# Patient Record
Sex: MALE | Race: WHITE | NOT HISPANIC OR LATINO | Employment: FULL TIME | URBAN - NONMETROPOLITAN AREA
[De-identification: names, ages, dates, MRNs, and addresses within clinical notes are randomized per-mention and may not be internally consistent; named-entity substitution may affect disease eponyms.]

---

## 2019-06-27 ENCOUNTER — HOSPITAL ENCOUNTER (OUTPATIENT)
Facility: HOSPITAL | Age: 49
Setting detail: OBSERVATION
Discharge: HOME | End: 2019-06-29
Attending: INTERNAL MEDICINE | Admitting: INTERNAL MEDICINE

## 2019-06-27 PROBLEM — N18.30 CHRONIC KIDNEY DISEASE, STAGE III (MODERATE) (HCC): Status: ACTIVE | Noted: 2019-06-27

## 2019-06-27 PROBLEM — R89.2 DRUG TOXICITY: Status: ACTIVE | Noted: 2019-06-27

## 2019-06-27 PROBLEM — I10 ESSENTIAL HYPERTENSION: Status: ACTIVE | Noted: 2019-06-27

## 2019-06-27 PROBLEM — R00.1 SYMPTOMATIC BRADYCARDIA: Status: ACTIVE | Noted: 2019-06-27

## 2019-06-27 PROCEDURE — 85025 COMPLETE CBC W/AUTO DIFF WBC: CPT | Performed by: NURSE PRACTITIONER

## 2019-06-27 PROCEDURE — G0378 HOSPITAL OBSERVATION PER HR: HCPCS

## 2019-06-27 PROCEDURE — 83036 HEMOGLOBIN GLYCOSYLATED A1C: CPT | Performed by: NURSE PRACTITIONER

## 2019-06-27 PROCEDURE — 80061 LIPID PANEL: CPT | Performed by: NURSE PRACTITIONER

## 2019-06-27 PROCEDURE — 80053 COMPREHEN METABOLIC PANEL: CPT | Performed by: NURSE PRACTITIONER

## 2019-06-27 PROCEDURE — 84484 ASSAY OF TROPONIN QUANT: CPT | Performed by: NURSE PRACTITIONER

## 2019-06-27 PROCEDURE — 84443 ASSAY THYROID STIM HORMONE: CPT | Performed by: NURSE PRACTITIONER

## 2019-06-27 RX ORDER — METOPROLOL SUCCINATE 100 MG/1
1.5 TABLET, EXTENDED RELEASE ORAL DAILY
COMMUNITY
End: 2019-06-29 | Stop reason: HOSPADM

## 2019-06-27 RX ORDER — SODIUM CHLORIDE 9 MG/ML
75 INJECTION, SOLUTION INTRAVENOUS CONTINUOUS
Status: DISCONTINUED | OUTPATIENT
Start: 2019-06-28 | End: 2019-06-28

## 2019-06-27 RX ORDER — HYDROCODONE BITARTRATE AND ACETAMINOPHEN 7.5; 325 MG/1; MG/1
1 TABLET ORAL EVERY 6 HOURS PRN
Status: DISCONTINUED | OUTPATIENT
Start: 2019-06-27 | End: 2019-06-29 | Stop reason: HOSPADM

## 2019-06-27 RX ORDER — CLONAZEPAM 1 MG/1
1 TABLET ORAL 2 TIMES DAILY
COMMUNITY

## 2019-06-27 RX ORDER — CARBAMAZEPINE 200 MG/1
1-2 TABLET ORAL 2 TIMES DAILY
COMMUNITY

## 2019-06-27 RX ORDER — ACETAMINOPHEN 325 MG/1
650 TABLET ORAL EVERY 4 HOURS PRN
Status: DISCONTINUED | OUTPATIENT
Start: 2019-06-27 | End: 2019-06-29 | Stop reason: HOSPADM

## 2019-06-27 RX ORDER — ALLOPURINOL 300 MG/1
300 TABLET ORAL DAILY
Status: DISCONTINUED | OUTPATIENT
Start: 2019-06-28 | End: 2019-06-29 | Stop reason: HOSPADM

## 2019-06-27 RX ORDER — GEMFIBROZIL 600 MG/1
1 TABLET, FILM COATED ORAL
COMMUNITY

## 2019-06-27 RX ORDER — LITHIUM CARBONATE 300 MG/1
300 TABLET, FILM COATED, EXTENDED RELEASE ORAL EVERY 12 HOURS SCHEDULED
Status: DISCONTINUED | OUTPATIENT
Start: 2019-06-28 | End: 2019-06-29 | Stop reason: HOSPADM

## 2019-06-27 RX ORDER — LITHIUM CARBONATE 300 MG
1 TABLET ORAL EVERY 12 HOURS SCHEDULED
COMMUNITY

## 2019-06-27 RX ORDER — CLONAZEPAM 1 MG/1
1 TABLET ORAL 2 TIMES DAILY
Status: DISCONTINUED | OUTPATIENT
Start: 2019-06-28 | End: 2019-06-29 | Stop reason: HOSPADM

## 2019-06-27 RX ORDER — ALLOPURINOL 300 MG/1
1 TABLET ORAL DAILY
COMMUNITY

## 2019-06-27 RX ORDER — ONDANSETRON 2 MG/ML
4 INJECTION INTRAMUSCULAR; INTRAVENOUS EVERY 6 HOURS PRN
Status: DISCONTINUED | OUTPATIENT
Start: 2019-06-27 | End: 2019-06-29 | Stop reason: HOSPADM

## 2019-06-27 RX ORDER — ONDANSETRON 4 MG/1
4 TABLET, FILM COATED ORAL EVERY 6 HOURS PRN
Status: DISCONTINUED | OUTPATIENT
Start: 2019-06-27 | End: 2019-06-29 | Stop reason: HOSPADM

## 2019-06-27 RX ORDER — CARBAMAZEPINE 200 MG/1
400 TABLET ORAL 2 TIMES DAILY
Status: DISCONTINUED | OUTPATIENT
Start: 2019-06-28 | End: 2019-06-29 | Stop reason: HOSPADM

## 2019-06-27 RX ORDER — SODIUM CHLORIDE 0.9 % (FLUSH) 0.9 %
3-10 SYRINGE (ML) INJECTION AS NEEDED
Status: DISCONTINUED | OUTPATIENT
Start: 2019-06-27 | End: 2019-06-29 | Stop reason: HOSPADM

## 2019-06-27 RX ORDER — HYDROCODONE BITARTRATE AND ACETAMINOPHEN 7.5; 325 MG/1; MG/1
1 TABLET ORAL EVERY 6 HOURS PRN
COMMUNITY
End: 2019-08-02

## 2019-06-27 RX ORDER — SODIUM CHLORIDE 0.9 % (FLUSH) 0.9 %
3 SYRINGE (ML) INJECTION EVERY 12 HOURS SCHEDULED
Status: DISCONTINUED | OUTPATIENT
Start: 2019-06-28 | End: 2019-06-29 | Stop reason: HOSPADM

## 2019-06-28 LAB
ALBUMIN SERPL-MCNC: 4.7 G/DL (ref 3.5–5)
ALBUMIN/GLOB SERPL: 1.6 G/DL (ref 1.1–2.5)
ALP SERPL-CCNC: 129 U/L (ref 24–120)
ALT SERPL W P-5'-P-CCNC: 15 U/L (ref 0–54)
ANION GAP SERPL CALCULATED.3IONS-SCNC: 10 MMOL/L (ref 4–13)
ARTICHOKE IGE QN: 112 MG/DL (ref 0–99)
AST SERPL-CCNC: 16 U/L (ref 7–45)
BASOPHILS # BLD AUTO: 0.11 10*3/MM3 (ref 0–0.2)
BASOPHILS NFR BLD AUTO: 1.5 % (ref 0–2)
BILIRUB SERPL-MCNC: 0.3 MG/DL (ref 0.1–1)
BUN BLD-MCNC: 26 MG/DL (ref 5–21)
BUN/CREAT SERPL: 14.5 (ref 7–25)
CALCIUM SPEC-SCNC: 10.1 MG/DL (ref 8.4–10.4)
CHLORIDE SERPL-SCNC: 120 MMOL/L (ref 98–110)
CHOLEST SERPL-MCNC: 185 MG/DL (ref 130–200)
CO2 SERPL-SCNC: 21 MMOL/L (ref 24–31)
CREAT BLD-MCNC: 1.79 MG/DL (ref 0.5–1.4)
DEPRECATED RDW RBC AUTO: 46.8 FL (ref 40–54)
EOSINOPHIL # BLD AUTO: 0.5 10*3/MM3 (ref 0–0.7)
EOSINOPHIL NFR BLD AUTO: 7 % (ref 0–4)
ERYTHROCYTE [DISTWIDTH] IN BLOOD BY AUTOMATED COUNT: 13.9 % (ref 12–15)
GFR SERPL CREATININE-BSD FRML MDRD: 40 ML/MIN/1.73
GLOBULIN UR ELPH-MCNC: 3 GM/DL
GLUCOSE BLD-MCNC: 115 MG/DL (ref 70–100)
HBA1C MFR BLD: 6.1 %
HCT VFR BLD AUTO: 38.8 % (ref 40–52)
HDLC SERPL-MCNC: 33 MG/DL
HGB BLD-MCNC: 13 G/DL (ref 14–18)
IMM GRANULOCYTES # BLD AUTO: 0.02 10*3/MM3 (ref 0–0.05)
IMM GRANULOCYTES NFR BLD AUTO: 0.3 % (ref 0–5)
LDLC/HDLC SERPL: 2.9 {RATIO}
LYMPHOCYTES # BLD AUTO: 2.42 10*3/MM3 (ref 0.72–4.86)
LYMPHOCYTES NFR BLD AUTO: 33.8 % (ref 15–45)
MCH RBC QN AUTO: 31.4 PG (ref 28–32)
MCHC RBC AUTO-ENTMCNC: 33.5 G/DL (ref 33–36)
MCV RBC AUTO: 93.7 FL (ref 82–95)
MONOCYTES # BLD AUTO: 0.43 10*3/MM3 (ref 0.19–1.3)
MONOCYTES NFR BLD AUTO: 6 % (ref 4–12)
NEUTROPHILS # BLD AUTO: 3.67 10*3/MM3 (ref 1.87–8.4)
NEUTROPHILS NFR BLD AUTO: 51.4 % (ref 39–78)
NRBC BLD AUTO-RTO: 0 /100 WBC (ref 0–0.2)
PLATELET # BLD AUTO: 258 10*3/MM3 (ref 130–400)
PMV BLD AUTO: 10.5 FL (ref 6–12)
POTASSIUM BLD-SCNC: 3.9 MMOL/L (ref 3.5–5.3)
PROT SERPL-MCNC: 7.7 G/DL (ref 6.3–8.7)
RBC # BLD AUTO: 4.14 10*6/MM3 (ref 4.8–5.9)
SODIUM BLD-SCNC: 151 MMOL/L (ref 135–145)
TRIGL SERPL-MCNC: 282 MG/DL (ref 0–149)
TROPONIN I SERPL-MCNC: <0.012 NG/ML (ref 0–0.03)
TSH SERPL DL<=0.05 MIU/L-ACNC: 2.13 MIU/ML (ref 0.47–4.68)
WBC NRBC COR # BLD: 7.15 10*3/MM3 (ref 4.8–10.8)

## 2019-06-28 PROCEDURE — 94760 N-INVAS EAR/PLS OXIMETRY 1: CPT

## 2019-06-28 PROCEDURE — 94799 UNLISTED PULMONARY SVC/PX: CPT

## 2019-06-28 PROCEDURE — 96361 HYDRATE IV INFUSION ADD-ON: CPT

## 2019-06-28 PROCEDURE — 96360 HYDRATION IV INFUSION INIT: CPT

## 2019-06-28 PROCEDURE — G0378 HOSPITAL OBSERVATION PER HR: HCPCS

## 2019-06-28 PROCEDURE — 92943 PRQ TRLUML REVSC CH OCC ANT: CPT | Performed by: INTERNAL MEDICINE

## 2019-06-28 PROCEDURE — 94762 N-INVAS EAR/PLS OXIMTRY CONT: CPT

## 2019-06-28 RX ORDER — AMLODIPINE BESYLATE 5 MG/1
5 TABLET ORAL
Status: DISCONTINUED | OUTPATIENT
Start: 2019-06-28 | End: 2019-06-29 | Stop reason: HOSPADM

## 2019-06-28 RX ORDER — LOSARTAN POTASSIUM 25 MG/1
25 TABLET ORAL
Status: DISCONTINUED | OUTPATIENT
Start: 2019-06-28 | End: 2019-06-28

## 2019-06-28 RX ORDER — LOSARTAN POTASSIUM 25 MG/1
25 TABLET ORAL
Status: DISCONTINUED | OUTPATIENT
Start: 2019-06-28 | End: 2019-06-28 | Stop reason: SDUPTHER

## 2019-06-28 RX ADMIN — SODIUM CHLORIDE, PRESERVATIVE FREE 3 ML: 5 INJECTION INTRAVENOUS at 00:24

## 2019-06-28 RX ADMIN — CARBAMAZEPINE 200 MG: 200 TABLET ORAL at 20:18

## 2019-06-28 RX ADMIN — CLONAZEPAM 1 MG: 1 TABLET ORAL at 08:10

## 2019-06-28 RX ADMIN — ALLOPURINOL 300 MG: 300 TABLET ORAL at 08:10

## 2019-06-28 RX ADMIN — LITHIUM CARBONATE 300 MG: 300 TABLET, FILM COATED, EXTENDED RELEASE ORAL at 08:10

## 2019-06-28 RX ADMIN — AMLODIPINE BESYLATE 5 MG: 5 TABLET ORAL at 12:03

## 2019-06-28 RX ADMIN — CARBAMAZEPINE 400 MG: 200 TABLET ORAL at 00:23

## 2019-06-28 RX ADMIN — CLONAZEPAM 1 MG: 1 TABLET ORAL at 20:20

## 2019-06-28 RX ADMIN — LITHIUM CARBONATE 300 MG: 300 TABLET, FILM COATED, EXTENDED RELEASE ORAL at 00:23

## 2019-06-28 RX ADMIN — CARBAMAZEPINE 400 MG: 200 TABLET ORAL at 08:10

## 2019-06-28 RX ADMIN — SODIUM CHLORIDE 75 ML/HR: 9 INJECTION, SOLUTION INTRAVENOUS at 01:39

## 2019-06-28 RX ADMIN — SODIUM CHLORIDE, PRESERVATIVE FREE 3 ML: 5 INJECTION INTRAVENOUS at 20:21

## 2019-06-28 RX ADMIN — CLONAZEPAM 1 MG: 1 TABLET ORAL at 00:23

## 2019-06-28 RX ADMIN — LITHIUM CARBONATE 300 MG: 300 TABLET, FILM COATED, EXTENDED RELEASE ORAL at 20:18

## 2019-06-29 VITALS
TEMPERATURE: 97.7 F | RESPIRATION RATE: 20 BRPM | SYSTOLIC BLOOD PRESSURE: 150 MMHG | WEIGHT: 202.56 LBS | HEART RATE: 68 BPM | OXYGEN SATURATION: 98 % | DIASTOLIC BLOOD PRESSURE: 84 MMHG | BODY MASS INDEX: 26 KG/M2 | HEIGHT: 74 IN

## 2019-06-29 LAB
ANION GAP SERPL CALCULATED.3IONS-SCNC: 13 MMOL/L (ref 4–13)
BUN BLD-MCNC: 21 MG/DL (ref 5–21)
BUN/CREAT SERPL: 13.3 (ref 7–25)
CALCIUM SPEC-SCNC: 10.2 MG/DL (ref 8.4–10.4)
CHLORIDE SERPL-SCNC: 117 MMOL/L (ref 98–110)
CO2 SERPL-SCNC: 18 MMOL/L (ref 24–31)
CREAT BLD-MCNC: 1.58 MG/DL (ref 0.5–1.4)
DEPRECATED RDW RBC AUTO: 47.6 FL (ref 40–54)
ERYTHROCYTE [DISTWIDTH] IN BLOOD BY AUTOMATED COUNT: 14.1 % (ref 12–15)
GFR SERPL CREATININE-BSD FRML MDRD: 47 ML/MIN/1.73
GLUCOSE BLD-MCNC: 126 MG/DL (ref 70–100)
HCT VFR BLD AUTO: 37.5 % (ref 40–52)
HGB BLD-MCNC: 12.4 G/DL (ref 14–18)
MCH RBC QN AUTO: 31 PG (ref 28–32)
MCHC RBC AUTO-ENTMCNC: 33.1 G/DL (ref 33–36)
MCV RBC AUTO: 93.8 FL (ref 82–95)
PLATELET # BLD AUTO: 258 10*3/MM3 (ref 130–400)
PMV BLD AUTO: 10.1 FL (ref 6–12)
POTASSIUM BLD-SCNC: 4.2 MMOL/L (ref 3.5–5.3)
RBC # BLD AUTO: 4 10*6/MM3 (ref 4.8–5.9)
SODIUM BLD-SCNC: 148 MMOL/L (ref 135–145)
WBC NRBC COR # BLD: 8.37 10*3/MM3 (ref 4.8–10.8)

## 2019-06-29 PROCEDURE — 85027 COMPLETE CBC AUTOMATED: CPT | Performed by: NURSE PRACTITIONER

## 2019-06-29 PROCEDURE — G0378 HOSPITAL OBSERVATION PER HR: HCPCS

## 2019-06-29 PROCEDURE — 80048 BASIC METABOLIC PNL TOTAL CA: CPT | Performed by: NURSE PRACTITIONER

## 2019-06-29 RX ORDER — AMLODIPINE BESYLATE 5 MG/1
5 TABLET ORAL
Qty: 30 TABLET | Refills: 0 | Status: SHIPPED | OUTPATIENT
Start: 2019-06-30 | End: 2019-08-02 | Stop reason: DRUGHIGH

## 2019-06-29 RX ADMIN — AMLODIPINE BESYLATE 5 MG: 5 TABLET ORAL at 08:20

## 2019-06-29 RX ADMIN — CARBAMAZEPINE 400 MG: 200 TABLET ORAL at 08:20

## 2019-06-29 RX ADMIN — LITHIUM CARBONATE 300 MG: 300 TABLET, FILM COATED, EXTENDED RELEASE ORAL at 08:20

## 2019-06-29 RX ADMIN — SODIUM CHLORIDE, PRESERVATIVE FREE 3 ML: 5 INJECTION INTRAVENOUS at 08:21

## 2019-06-29 RX ADMIN — CLONAZEPAM 1 MG: 1 TABLET ORAL at 08:20

## 2019-06-29 RX ADMIN — ALLOPURINOL 300 MG: 300 TABLET ORAL at 08:20

## 2019-08-02 ENCOUNTER — OFFICE VISIT (OUTPATIENT)
Dept: CARDIOLOGY | Facility: CLINIC | Age: 49
End: 2019-08-02

## 2019-08-02 VITALS
SYSTOLIC BLOOD PRESSURE: 152 MMHG | HEART RATE: 77 BPM | BODY MASS INDEX: 26.34 KG/M2 | HEIGHT: 74 IN | DIASTOLIC BLOOD PRESSURE: 80 MMHG | WEIGHT: 205.2 LBS | OXYGEN SATURATION: 98 %

## 2019-08-02 DIAGNOSIS — E78.00 ELEVATED CHOLESTEROL: Chronic | ICD-10-CM

## 2019-08-02 DIAGNOSIS — R00.1 SYMPTOMATIC BRADYCARDIA: ICD-10-CM

## 2019-08-02 DIAGNOSIS — N18.30 CHRONIC KIDNEY DISEASE, STAGE III (MODERATE) (HCC): ICD-10-CM

## 2019-08-02 DIAGNOSIS — I10 ESSENTIAL HYPERTENSION: Primary | ICD-10-CM

## 2019-08-02 PROBLEM — R89.2 DRUG TOXICITY: Status: RESOLVED | Noted: 2019-06-27 | Resolved: 2019-08-02

## 2019-08-02 PROCEDURE — 99243 OFF/OP CNSLTJ NEW/EST LOW 30: CPT | Performed by: NURSE PRACTITIONER

## 2019-08-02 PROCEDURE — 93000 ELECTROCARDIOGRAM COMPLETE: CPT | Performed by: NURSE PRACTITIONER

## 2019-08-02 RX ORDER — HYDRALAZINE HYDROCHLORIDE 50 MG/1
50 TABLET, FILM COATED ORAL 2 TIMES DAILY
Refills: 2 | COMMUNITY
Start: 2019-07-23 | End: 2019-08-02 | Stop reason: SDUPTHER

## 2019-08-02 RX ORDER — HYDRALAZINE HYDROCHLORIDE 50 MG/1
50 TABLET, FILM COATED ORAL 3 TIMES DAILY
Qty: 270 TABLET | Refills: 3 | Status: SHIPPED | OUTPATIENT
Start: 2019-08-02 | End: 2022-05-13

## 2019-08-02 RX ORDER — AMLODIPINE BESYLATE 10 MG/1
10 TABLET ORAL DAILY
Refills: 0 | COMMUNITY
Start: 2019-07-22

## 2019-08-02 NOTE — ASSESSMENT & PLAN NOTE
Triglycerides high and HDL low per 6/2019 labs. Encouraged low fat, low sugar diet. Encouraged routine aerobic exercise.

## 2019-08-02 NOTE — ASSESSMENT & PLAN NOTE
Not controlled per home or today's readings. Increase hydralazine to 50 mg 3 times per day. Avoid high sodium. Increase aerobic activity. Call in 2 weeks if BP remains greater than 130/80 for further medication adjustments. Medications limited by lithium. Bring monitor by office to ensure accuracy. Provided DASH dietary information.

## 2019-08-02 NOTE — PROGRESS NOTES
Subjective:     Encounter Date:2019    Chief Complaint:    Patient ID: Hilario Tomas is a 49 y.o. male here today for cardiac hospital follow-up and at the request of Dr. Dial. He was seen by Juany Stewart NP and Dr. Car at [facility] late 2019 when his heart rate was low in the 30s and having near syncope. Heart rates have improved since metoprolol stopped. Dr. Dial ordered an echocardiogram and a holter monitor but no results are available for review. Unclear if done or not since he went to [facility] ER and then transferred to [facility].     HPI     Slow Heart Rate      Additional comments: hospital fu from  for HR of 30, metoprolol stopped and started on amlodipine and hydralazine which was increased on 2019. Heart rates 60-70s now.              Hypertension      Additional comments: has been running a little high, 140-170s/100-110s          Last edited by Amanda Edwards APRN on 2019  9:50 AM. (History)        History:   Past Medical History:   Diagnosis Date   • Arthritis    • Asthma    • Bipolar and related disorder (CMS/HCC)    • Chronic kidney disease    • Elevated cholesterol    • Gout    • Hypertension    • MRSA infection      Past Surgical History:   Procedure Laterality Date   • TOTAL HIP ARTHROPLASTY Right 2019     Social History     Socioeconomic History   • Marital status:      Spouse name: Not on file   • Number of children: Not on file   • Years of education: Not on file   • Highest education level: Not on file   Tobacco Use   • Smoking status: Former Smoker     Packs/day: 1.00     Years: 3.00     Pack years: 3.00     Types: Cigarettes, Cigars     Start date: 1989     Last attempt to quit: 1992     Years since quittin.6   • Smokeless tobacco: Former User     Types: Chew, Snuff     Quit date: 1992   Substance and Sexual Activity   • Alcohol use: No     Frequency: Never   • Drug use: No   • Sexual activity: Defer     Family History   Problem Relation  Age of Onset   • Hyperlipidemia Mother    • Heart disease Mother    • Hypertension Father    • Heart disease Father    • Kidney disease Father    • Hyperlipidemia Father    • CAR disease Daughter    • Depression Son    • No Known Problems Son        Outpatient Medications Marked as Taking for the 8/2/19 encounter (Office Visit) with Amanda Edwards APRN   Medication Sig Dispense Refill   • allopurinol (ZYLOPRIM) 300 MG tablet Take 1 tablet by mouth Daily.     • amLODIPine (NORVASC) 10 MG tablet Take 10 mg by mouth Daily.  0   • carBAMazepine (TEGretol) 200 MG tablet Take 1-2 tablets by mouth 2 (Two) Times a Day. 2 tabs am, 1 tab pm     • clonazePAM (KlonoPIN) 1 MG tablet Take 1 tablet by mouth 2 (Two) Times a Day.     • gemfibrozil (LOPID) 600 MG tablet Take 1 tablet by mouth Daily With Dinner.     • lithium 300 MG tablet Take 1 tablet by mouth Every 12 (Twelve) Hours.     • [DISCONTINUED] amLODIPine (NORVASC) 5 MG tablet Take 1 tablet by mouth Daily. (Patient taking differently: Take 10 mg by mouth Daily.) 30 tablet 0   Also taking Hydralazine 50 mg BID    Review of Systems:  Review of Systems   Constitution: Positive for malaise/fatigue (mild, improved with better heart rates). Negative for chills, decreased appetite and fever.   HENT: Positive for nosebleeds (a little occasionally, zena in the mornings). Negative for congestion.    Eyes: Negative for blurred vision and double vision.   Cardiovascular: Positive for leg swelling (after starting hydralazine). Negative for chest pain, dyspnea on exertion, irregular heartbeat, near-syncope, palpitations and syncope.   Respiratory: Positive for cough, shortness of breath (after completing activity) and sputum production (a little white phlegm in the morning). Negative for wheezing.    Endocrine: Positive for heat intolerance and polydipsia. Negative for cold intolerance.   Skin: Positive for dry skin, itching and rash (sometimes on legs).   Musculoskeletal:  "Positive for arthritis and back pain. Negative for falls and joint pain.   Gastrointestinal: Positive for constipation. Negative for abdominal pain, diarrhea, nausea and vomiting.   Genitourinary: Positive for nocturia (2-4 times) and urgency. Negative for dysuria.   Neurological: Positive for dizziness (when first stands) and headaches (not often). Negative for excessive daytime sleepiness, light-headedness and loss of balance.   Psychiatric/Behavioral: Negative for depression. The patient has insomnia (has trouble staying asleep, GENA 6/2019 was normal). The patient is not nervous/anxious.           Objective:   /80 (BP Location: Left arm, Patient Position: Sitting, Cuff Size: Adult)   Pulse 77   Ht 188 cm (74\")   Wt 93.1 kg (205 lb 3.2 oz)   SpO2 98%   BMI 26.35 kg/m²   Wt Readings from Last 3 Encounters:   08/02/19 93.1 kg (205 lb 3.2 oz)   06/28/19 91.9 kg (202 lb 9 oz)         Physical Exam   Constitutional: He is oriented to person, place, and time. He appears well-developed and well-nourished.   Eyes: No scleral icterus.   Neck: No JVD present.   Cardiovascular: Normal rate, regular rhythm, normal heart sounds and intact distal pulses. Exam reveals no gallop and no friction rub.   No murmur heard.  Pulmonary/Chest: Effort normal and breath sounds normal.   Musculoskeletal: He exhibits no edema.   Neurological: He is alert and oriented to person, place, and time.   Skin: Skin is warm and dry.   Psychiatric: He has a normal mood and affect.   Vitals reviewed.      Lab/Diagnostics Review:   Lab Results   Component Value Date    WBC 8.37 06/29/2019    HGB 12.4 (L) 06/29/2019    HCT 37.5 (L) 06/29/2019    MCV 93.8 06/29/2019     06/29/2019     Lab Results   Component Value Date    GLUCOSE 126 (H) 06/29/2019    BUN 21 06/29/2019    CREATININE 1.58 (H) 06/29/2019    EGFRIFNONA 47 (L) 06/29/2019    BCR 13.3 06/29/2019    K 4.2 06/29/2019    CO2 18.0 (L) 06/29/2019    CALCIUM 10.2 06/29/2019    " ALBUMIN 4.70 06/27/2019    AST 16 06/27/2019    ALT 15 06/27/2019     Lab Results   Component Value Date    TSH 2.130 06/27/2019     Lab Results   Component Value Date    TROPONINI <0.012 06/27/2019     Lab Results   Component Value Date    CHOL 185 06/27/2019    TRIG 282 (H) 06/27/2019    HDL 33 (L) 06/27/2019     (H) 06/27/2019 6/28/2019 overnight oximetry on room air desaturation event index 1.6, lowest SPO2 77%, less than 5 minutes below 90%    3/19/2019 EKG sinus bradycardia 51 bpm, possible right ventricular conduction delay, no previous available for comparison      ECG 12 Lead  Date/Time: 8/2/2019 5:34 PM  Performed by: Amanda Edwards APRN  Authorized by: Amanda Edwards APRN   Comparison: compared with previous ECG from 3/9/2019  Comparison to previous ECG: HR has increased from 51 bpm  Rhythm: sinus rhythm  Rate: normal  BPM: 75  Conduction: left anterior fascicular block    Clinical impression: abnormal EKG                Assessment/Plan:         Problem List Items Addressed This Visit        Cardiovascular and Mediastinum    Essential hypertension - Primary (Chronic)    Current Assessment & Plan     Not controlled per home or today's readings. Increase hydralazine to 50 mg 3 times per day. Avoid high sodium. Increase aerobic activity. Call in 2 weeks if BP remains greater than 130/80 for further medication adjustments. Medications limited by lithium. Bring monitor by office to ensure accuracy. Provided DASH dietary information.         Relevant Medications    amLODIPine (NORVASC) 10 MG tablet    hydrALAZINE (APRESOLINE) 50 MG tablet    Elevated cholesterol (Chronic)    Current Assessment & Plan     Triglycerides high and HDL low per 6/2019 labs. Encouraged low fat, low sugar diet. Encouraged routine aerobic exercise.         RESOLVED: Symptomatic bradycardia    Current Assessment & Plan     Secondary to beta-blockade. Avoid rate slowing medications.            Genitourinary     Chronic kidney disease, stage III (moderate) (CMS/HCC)    Current Assessment & Plan     Followed by nephrology             Return in about 6 months (around 2/2/2020) for Recheck.           ANUSHKA Trevizo, ACNP-BC, CHFN-BC

## 2019-08-02 NOTE — PATIENT INSTRUCTIONS
Exercising to Stay Healthy  To become healthy and stay healthy, it is recommended that you do moderate-intensity and vigorous-intensity exercise. You can tell that you are exercising at a moderate intensity if your heart starts beating faster and you start breathing faster but can still hold a conversation. You can tell that you are exercising at a vigorous intensity if you are breathing much harder and faster and cannot hold a conversation while exercising.  Exercising regularly is important. It has many health benefits, such as:  · Improving overall fitness, flexibility, and endurance.  · Increasing bone density.  · Helping with weight control.  · Decreasing body fat.  · Increasing muscle strength.  · Reducing stress and tension.  · Improving overall health.  How often should I exercise?  Choose an activity that you enjoy, and set realistic goals. Your health care provider can help you make an activity plan that works for you.  Exercise regularly as told by your health care provider. This may include:  · Doing strength training two times a week, such as:  ? Lifting weights.  ? Using resistance bands.  ? Push-ups.  ? Sit-ups.  ? Yoga.  · Doing a certain intensity of exercise for a given amount of time. Choose from these options:  ? A total of 150 minutes of moderate-intensity exercise every week.  ? A total of 75 minutes of vigorous-intensity exercise every week.  ? A mix of moderate-intensity and vigorous-intensity exercise every week.  Children, pregnant women, people who have not exercised regularly, people who are overweight, and older adults may need to talk with a health care provider about what activities are safe to do. If you have a medical condition, be sure to talk with your health care provider before you start a new exercise program.  What are some exercise ideas?  Moderate-intensity exercise ideas include:  · Walking 1 mile (1.6 km) in about 15  minutes.  · Biking.  · Hiking.  · Golfing.  · Dancing.  · Water aerobics.  Vigorous-intensity exercise ideas include:  · Walking 4.5 miles (7.2 km) or more in about 1 hour.  · Jogging or running 5 miles (8 km) in about 1 hour.  · Biking 10 miles (16.1 km) or more in about 1 hour.  · Lap swimming.  · Roller-skating or in-line skating.  · Cross-country skiing.  · Vigorous competitive sports, such as football, basketball, and soccer.  · Jumping rope.  · Aerobic dancing.  What are some everyday activities that can help me to get exercise?  · Yard work, such as:  ? Pushing a .  ? Raking and bagging leaves.  · Washing your car.  · Pushing a stroller.  · Shoveling snow.  · Gardening.  · Washing windows or floors.  How can I be more active in my day-to-day activities?  · Use stairs instead of an elevator.  · Take a walk during your lunch break.  · If you drive, park your car farther away from your work or school.  · If you take public transportation, get off one stop early and walk the rest of the way.  · Stand up or walk around during all of your indoor phone calls.  · Get up, stretch, and walk around every 30 minutes throughout the day.  · Enjoy exercise with a friend. Support to continue exercising will help you keep a regular routine of activity.  What guidelines can I follow while exercising?  · Before you start a new exercise program, talk with your health care provider.  · Do not exercise so much that you hurt yourself, feel dizzy, or get very short of breath.  · Wear comfortable clothes and wear shoes with good support.  · Drink plenty of water while you exercise to prevent dehydration or heat stroke.  · Work out until your breathing and your heartbeat get faster.  Where to find more information  · [place] Department of Health and Human Services: www.hhs.gov  · Centers for Disease Control and Prevention (CDC): www.cdc.gov  Summary  · Exercising regularly is important. It will improve your overall fitness,  "flexibility, and endurance.  · Regular exercise also will improve your overall health. It can help you control your weight, reduce stress, and improve your bone density.  · Do not exercise so much that you hurt yourself, feel dizzy, or get very short of breath.  · Before you start a new exercise program, talk with your health care provider.  This information is not intended to replace advice given to you by your health care provider. Make sure you discuss any questions you have with your health care provider.  Document Released: 01/20/2012 Document Revised: 11/08/2018 Document Reviewed: 11/08/2018  [employer] Interactive Patient Education © 2019 [employer]    DASH Eating Plan  DASH stands for \"Dietary Approaches to Stop Hypertension.\" The DASH eating plan is a healthy eating plan that has been shown to reduce high blood pressure (hypertension). It may also reduce your risk for type 2 diabetes, heart disease, and stroke. The DASH eating plan may also help with weight loss.  What are tips for following this plan?    General guidelines  · Avoid eating more than 2,300 mg (milligrams) of salt (sodium) a day. If you have hypertension, you may need to reduce your sodium intake to 1,500 mg a day.  · Limit alcohol intake to no more than 1 drink a day for nonpregnant women and 2 drinks a day for men. One drink equals 12 oz of beer, 5 oz of wine, or 1½ oz of hard liquor.  · Work with your health care provider to maintain a healthy body weight or to lose weight. Ask what an ideal weight is for you.  · Get at least 30 minutes of exercise that causes your heart to beat faster (aerobic exercise) most days of the week. Activities may include walking, swimming, or biking.  · Work with your health care provider or diet and nutrition specialist (dietitian) to adjust your eating plan to your individual calorie needs.  Reading food labels    · Check food labels for the amount of sodium per serving. Choose foods with less than 5 percent " "of the Daily Value of sodium. Generally, foods with less than 300 mg of sodium per serving fit into this eating plan.  · To find whole grains, look for the word \"whole\" as the first word in the ingredient list.  Shopping  · Buy products labeled as \"low-sodium\" or \"no salt added.\"  · Buy fresh foods. Avoid canned foods and premade or frozen meals.  Cooking  · Avoid adding salt when cooking. Use salt-free seasonings or herbs instead of table salt or sea salt. Check with your health care provider or pharmacist before using salt substitutes.  · Do not gambino foods. Cook foods using healthy methods such as baking, boiling, grilling, and broiling instead.  · Cook with heart-healthy oils, such as olive, canola, soybean, or sunflower oil.  Meal planning  · Eat a balanced diet that includes:  ? 5 or more servings of fruits and vegetables each day. At each meal, try to fill half of your plate with fruits and vegetables.  ? Up to 6-8 servings of whole grains each day.  ? Less than 6 oz of lean meat, poultry, or fish each day. A 3-oz serving of meat is about the same size as a deck of cards. One egg equals 1 oz.  ? 2 servings of low-fat dairy each day.  ? A serving of nuts, seeds, or beans 5 times each week.  ? Heart-healthy fats. Healthy fats called Omega-3 fatty acids are found in foods such as flaxseeds and coldwater fish, like sardines, salmon, and mackerel.  · Limit how much you eat of the following:  ? Canned or prepackaged foods.  ? Food that is high in trans fat, such as fried foods.  ? Food that is high in saturated fat, such as fatty meat.  ? Sweets, desserts, sugary drinks, and other foods with added sugar.  ? Full-fat dairy products.  · Do not salt foods before eating.  · Try to eat at least 2 vegetarian meals each week.  · Eat more home-cooked food and less restaurant, buffet, and fast food.  · When eating at a restaurant, ask that your food be prepared with less salt or no salt, if possible.  What foods are " recommended?  The items listed may not be a complete list. Talk with your dietitian about what dietary choices are best for you.  Grains  Whole-grain or whole-wheat bread. Whole-grain or whole-wheat pasta. Brown rice. Oatmeal. Quinoa. Bulgur. Whole-grain and low-sodium cereals. Tamra bread. Low-fat, low-sodium crackers. Whole-wheat flour tortillas.  Vegetables  Fresh or frozen vegetables (raw, steamed, roasted, or grilled). Low-sodium or reduced-sodium tomato and vegetable juice. Low-sodium or reduced-sodium tomato sauce and tomato paste. Low-sodium or reduced-sodium canned vegetables.  Fruits  All fresh, dried, or frozen fruit. Canned fruit in natural juice (without added sugar).  Meat and other protein foods  Skinless chicken or turkey. Ground chicken or turkey. Pork with fat trimmed off. Fish and seafood. Egg whites. Dried beans, peas, or lentils. Unsalted nuts, nut butters, and seeds. Unsalted canned beans. Lean cuts of beef with fat trimmed off. Low-sodium, lean deli meat.  Dairy  Low-fat (1%) or fat-free (skim) milk. Fat-free, low-fat, or reduced-fat cheeses. Nonfat, low-sodium ricotta or cottage cheese. Low-fat or nonfat yogurt. Low-fat, low-sodium cheese.  Fats and oils  Soft margarine without trans fats. Vegetable oil. Low-fat, reduced-fat, or light mayonnaise and salad dressings (reduced-sodium). Canola, safflower, olive, soybean, and sunflower oils. Avocado.  Seasoning and other foods  Herbs. Spices. Seasoning mixes without salt. Unsalted popcorn and pretzels. Fat-free sweets.  What foods are not recommended?  The items listed may not be a complete list. Talk with your dietitian about what dietary choices are best for you.  Grains  Baked goods made with fat, such as croissants, muffins, or some breads. Dry pasta or rice meal packs.  Vegetables  Creamed or fried vegetables. Vegetables in a cheese sauce. Regular canned vegetables (not low-sodium or reduced-sodium). Regular canned tomato sauce and paste (not  low-sodium or reduced-sodium). Regular tomato and vegetable juice (not low-sodium or reduced-sodium). Pickles. Olives.  Fruits  Canned fruit in a light or heavy syrup. Fried fruit. Fruit in cream or butter sauce.  Meat and other protein foods  Fatty cuts of meat. Ribs. Fried meat. Hernández. Sausage. Bologna and other processed lunch meats. Salami. Fatback. Hotdogs. Bratwurst. Salted nuts and seeds. Canned beans with added salt. Canned or smoked fish. Whole eggs or egg yolks. Chicken or turkey with skin.  Dairy  Whole or 2% milk, cream, and half-and-half. Whole or full-fat cream cheese. Whole-fat or sweetened yogurt. Full-fat cheese. Nondairy creamers. Whipped toppings. Processed cheese and cheese spreads.  Fats and oils  Butter. Stick margarine. Lard. Shortening. Ghee. Hernández fat. Tropical oils, such as coconut, palm kernel, or palm oil.  Seasoning and other foods  Salted popcorn and pretzels. Onion salt, garlic salt, seasoned salt, table salt, and sea salt. Worcestershire sauce. Tartar sauce. Barbecue sauce. Teriyaki sauce. Soy sauce, including reduced-sodium. Steak sauce. Canned and packaged gravies. Fish sauce. Oyster sauce. Cocktail sauce. Horseradish that you find on the shelf. Ketchup. Mustard. Meat flavorings and tenderizers. Bouillon cubes. Hot sauce and Tabasco sauce. Premade or packaged marinades. Premade or packaged taco seasonings. Relishes. Regular salad dressings.  Where to find more information:  · National Heart, Lung, and Blood [place]: www.nhlbi.nih.gov  · American Heart Association: www.heart.org  Summary  · The DASH eating plan is a healthy eating plan that has been shown to reduce high blood pressure (hypertension). It may also reduce your risk for type 2 diabetes, heart disease, and stroke.  · With the DASH eating plan, you should limit salt (sodium) intake to 2,300 mg a day. If you have hypertension, you may need to reduce your sodium intake to 1,500 mg a day.  · When on the DASH eating plan,  aim to eat more fresh fruits and vegetables, whole grains, lean proteins, low-fat dairy, and heart-healthy fats.  · Work with your health care provider or diet and nutrition specialist (dietitian) to adjust your eating plan to your individual calorie needs.  This information is not intended to replace advice given to you by your health care provider. Make sure you discuss any questions you have with your health care provider.  Document Released: 12/06/2012 Document Revised: 12/11/2017 Document Reviewed: 12/11/2017  [employer] Interactive Patient Education © 2019 [employer]

## 2020-06-23 ENCOUNTER — OFFICE VISIT (OUTPATIENT)
Dept: CARDIOLOGY | Facility: CLINIC | Age: 50
End: 2020-06-23

## 2020-06-23 VITALS
BODY MASS INDEX: 27.36 KG/M2 | WEIGHT: 213.2 LBS | DIASTOLIC BLOOD PRESSURE: 88 MMHG | HEART RATE: 68 BPM | HEIGHT: 74 IN | TEMPERATURE: 98.3 F | OXYGEN SATURATION: 99 % | SYSTOLIC BLOOD PRESSURE: 164 MMHG

## 2020-06-23 DIAGNOSIS — I10 ESSENTIAL HYPERTENSION: Primary | Chronic | ICD-10-CM

## 2020-06-23 DIAGNOSIS — E78.00 ELEVATED CHOLESTEROL: Chronic | ICD-10-CM

## 2020-06-23 DIAGNOSIS — N18.30 CHRONIC KIDNEY DISEASE, STAGE III (MODERATE) (HCC): ICD-10-CM

## 2020-06-23 PROCEDURE — 99214 OFFICE O/P EST MOD 30 MIN: CPT | Performed by: NURSE PRACTITIONER

## 2020-06-23 PROCEDURE — 93000 ELECTROCARDIOGRAM COMPLETE: CPT | Performed by: NURSE PRACTITIONER

## 2020-06-23 RX ORDER — PRAZOSIN HYDROCHLORIDE 1 MG/1
1 CAPSULE ORAL NIGHTLY
Qty: 30 CAPSULE | Refills: 11 | Status: SHIPPED | OUTPATIENT
Start: 2020-06-23 | End: 2020-12-11 | Stop reason: SINTOL

## 2020-06-23 RX ORDER — CLONIDINE 0.2 MG/24H
1 PATCH, EXTENDED RELEASE TRANSDERMAL WEEKLY
COMMUNITY
End: 2020-12-11 | Stop reason: ALTCHOICE

## 2020-06-23 NOTE — PROGRESS NOTES
Subjective:     Encounter Date:2020    Chief Complaint:    Patient ID: Hilario Tomas is a 49 y.o. male here today for overdue 6 month cardiac follow-up.    HPI     Hypertension      Additional comments: hasn't been checking BP very often, was 120/70s yesterday, trying not obsess over it but usually running 140-150/80-90s. On max tolerated hydralazine with a little bit of swelling and spots on his feet and ankles.              Slow Heart Rate      Additional comments: none since stopping rate slowing BP medications          Last edited by Amanda Edwards APRN on 2020 10:35 AM. (History)        History:   Past Medical History:   Diagnosis Date   • Arthritis    • Asthma    • Bipolar and related disorder (CMS/HCC)    • Chronic kidney disease    • Elevated cholesterol    • Gout    • Hypertension    • MRSA infection      Past Surgical History:   Procedure Laterality Date   • TOTAL HIP ARTHROPLASTY Right 2019     Social History     Socioeconomic History   • Marital status:      Spouse name: Not on file   • Number of children: Not on file   • Years of education: Not on file   • Highest education level: Not on file   Tobacco Use   • Smoking status: Former Smoker     Packs/day: 1.00     Years: 3.00     Pack years: 3.00     Types: Cigarettes, Cigars     Start date: 1989     Last attempt to quit: 1992     Years since quittin.4   • Smokeless tobacco: Former User     Types: Chew, Snuff     Quit date: 1992   Substance and Sexual Activity   • Alcohol use: No     Frequency: Never   • Drug use: No   • Sexual activity: Defer     Family History   Problem Relation Age of Onset   • Hyperlipidemia Mother    • Heart disease Mother    • Hypertension Father    • Heart disease Father    • Kidney disease Father    • Hyperlipidemia Father    • CAR disease Daughter    • Depression Son    • No Known Problems Son      Outpatient Medications Marked as Taking for the 20 encounter (Office Visit)  with Amanda Edwards APRN   Medication Sig Dispense Refill   • allopurinol (ZYLOPRIM) 300 MG tablet Take 1 tablet by mouth Daily.     • amLODIPine (NORVASC) 10 MG tablet Take 10 mg by mouth Daily.  0   • carBAMazepine (TEGretol) 200 MG tablet Take 1-2 tablets by mouth 2 (Two) Times a Day. 2 tabs am, 1 tab pm     • clonazePAM (KlonoPIN) 1 MG tablet Take 1 tablet by mouth 2 (Two) Times a Day.     • cloNIDine (CATAPRES-TTS) 0.2 MG/24HR patch Place 1 patch on the skin as directed by provider 1 (One) Time Per Week.     • gemfibrozil (LOPID) 600 MG tablet Take 1 tablet by mouth Daily With Dinner.     • hydrALAZINE (APRESOLINE) 50 MG tablet Take 1 tablet by mouth 3 (Three) Times a Day. (Patient taking differently: Take 200 mg by mouth 2 (two) times a day.) 270 tablet 3   • lithium 300 MG tablet Take 1 tablet by mouth Every 12 (Twelve) Hours.       Review of Systems:  Review of Systems   Constitution: Positive for malaise/fatigue (mild, improved with better heart rates). Negative for chills, decreased appetite and fever.   HENT: Positive for nosebleeds (a little occasionally, zena in the mornings). Negative for congestion.    Eyes: Negative for blurred vision and double vision.   Cardiovascular: Positive for leg swelling (feet). Negative for chest pain, dyspnea on exertion, irregular heartbeat, near-syncope, palpitations and syncope.   Respiratory: Positive for cough, shortness of breath (after completing activity) and sputum production (a little white phlegm in the morning). Negative for wheezing.    Endocrine: Positive for polydipsia. Negative for cold intolerance and heat intolerance.   Hematologic/Lymphatic: Bruises/bleeds easily.   Skin: Positive for rash (sometimes on legs). Negative for dry skin and itching.   Musculoskeletal: Positive for arthritis, back pain (lower), gout, joint pain, joint swelling, neck pain (sometimes ) and stiffness (sometimes ). Negative for falls and muscle cramps.   Gastrointestinal:  "Positive for constipation (sometimes ). Negative for abdominal pain, diarrhea, heartburn, melena, nausea and vomiting.   Genitourinary: Positive for nocturia (2-4 times) and urgency. Negative for dysuria and hematuria.   Neurological: Positive for dizziness (when first stands), headaches (not often), light-headedness, loss of balance and numbness (hands ). Negative for excessive daytime sleepiness.   Psychiatric/Behavioral: Negative for depression. The patient is nervous/anxious. The patient does not have insomnia (had trouble staying asleep, GENA 6/2019 was normal).         Objective:   /88 (BP Location: Left arm, Patient Position: Sitting, Cuff Size: Adult)   Pulse 68   Temp 98.3 °F (36.8 °C)   Ht 188 cm (74\")   Wt 96.7 kg (213 lb 3.2 oz)   SpO2 99%   BMI 27.37 kg/m²   Wt Readings from Last 3 Encounters:   06/23/20 96.7 kg (213 lb 3.2 oz)   08/02/19 93.1 kg (205 lb 3.2 oz)   06/28/19 91.9 kg (202 lb 9 oz)       Physical Exam   Constitutional: He is oriented to person, place, and time. He appears well-developed and well-nourished.   Eyes: No scleral icterus.   Neck: No JVD present.   Cardiovascular: Normal rate, regular rhythm, normal heart sounds and intact distal pulses. Exam reveals no gallop and no friction rub.   No murmur heard.  Pulmonary/Chest: Effort normal and breath sounds normal.   Musculoskeletal: He exhibits edema (puffiness to distal BLEs but no pitting edema).   Neurological: He is alert and oriented to person, place, and time.   Skin: Skin is warm and dry.   Psychiatric: He has a normal mood and affect.   Vitals reviewed.    Lab/Diagnostics Review:   6/22/2020 [facility] (not available for review - requested)    Lab Results   Component Value Date     WBC 8.37 06/29/2019     HGB 12.4 (L) 06/29/2019     HCT 37.5 (L) 06/29/2019     MCV 93.8 06/29/2019      06/29/2019            Lab Results   Component Value Date     GLUCOSE 126 (H) 06/29/2019     BUN 21 06/29/2019     CREATININE 1.58 (H) " 06/29/2019     EGFRIFNONA 47 (L) 06/29/2019     BCR 13.3 06/29/2019     K 4.2 06/29/2019     CO2 18.0 (L) 06/29/2019     CALCIUM 10.2 06/29/2019     ALBUMIN 4.70 06/27/2019     AST 16 06/27/2019     ALT 15 06/27/2019            Lab Results   Component Value Date     TSH 2.130 06/27/2019            Lab Results   Component Value Date     TROPONINI <0.012 06/27/2019            Lab Results   Component Value Date     CHOL 185 06/27/2019     TRIG 282 (H) 06/27/2019     HDL 33 (L) 06/27/2019      (H) 06/27/2019 6/28/2019 overnight oximetry on room air desaturation event index 1.6, lowest SPO2 77%, less than 5 minutes below 90%     3/19/2019 EKG sinus bradycardia 51 bpm, possible right ventricular conduction delay, no previous available for comparison      ECG 12 Lead  Date/Time: 8/2/2019 5:34 PM  Performed by: Amanda Edwards APRN  Authorized by: Amanda Edwards APRN   Comparison: compared with previous ECG from 3/9/2019  Comparison to previous ECG: HR has increased from 51 bpm  Rhythm: sinus rhythm  Rate: normal  BPM: 75  Conduction: left anterior fascicular block  Clinical impression: abnormal EKG      ECG 12 Lead  Date/Time: 6/23/2020 10:46 AM  Performed by: Amanda Edwards APRN  Authorized by: Amanda Edwards APRN   Comparison: compared with previous ECG from 8/2/2019  Similar to previous ECG  Rhythm: sinus rhythm  Rate: normal  BPM: 67  Conduction: left anterior fascicular block    Clinical impression: abnormal EKG              Assessment/Plan:       Problem List Items Addressed This Visit        Cardiovascular and Mediastinum    Elevated cholesterol (Chronic)    Current Assessment & Plan     Triglycerides high and HDL low per 6/2019 labs. Encouraged low fat, low sugar diet. Encouraged routine aerobic exercise. Requested copy of labs done at [facility] yesterday and if no lipid in the last year will check lipid panel with next labs due in September.          Essential hypertension -  Primary (Chronic)    Current Assessment & Plan     Add prazosin for optimal BP control (no known interaction with lithium or other medications). Cautioned him on hypotension (1st dose), orthostatic hypotension, near syncope, or syncope. Call if any intolerable side effects. Call BP readings in 2 weeks. Discussed mild LE most likely secondary to amlodipine but not necessary to stop or change at this time.           Relevant Medications    cloNIDine (CATAPRES-TTS) 0.2 MG/24HR patch    prazosin (MINIPRESS) 1 MG capsule    Other Relevant Orders    ECG 12 Lead       Genitourinary    Chronic kidney disease, stage III (moderate) (CMS/[facility])    Current Assessment & Plan     Followed by Dr. Reid. Continue to avoid ACEi or ARB due to potential interaction with lithium. Discussed that if absolutely necessary lithium dose could be monitored and decreased.             Return in about 6 months (around 12/23/2020) for Recheck.         ANUSHKA Trevizo, ACNP-BC, CHFN-BC

## 2020-06-23 NOTE — ASSESSMENT & PLAN NOTE
Triglycerides high and HDL low per 6/2019 labs. Encouraged low fat, low sugar diet. Encouraged routine aerobic exercise. Requested copy of labs done at [facility] yesterday and if no lipid in the last year will check lipid panel with next labs due in September.

## 2020-06-23 NOTE — ASSESSMENT & PLAN NOTE
Followed by Dr. Reid. Continue to avoid ACEi or ARB due to potential interaction with lithium. Discussed that if absolutely necessary lithium dose could be monitored and decreased.

## 2020-06-23 NOTE — ASSESSMENT & PLAN NOTE
Add prazosin for optimal BP control (no known interaction with lithium or other medications). Cautioned him on hypotension (1st dose), orthostatic hypotension, near syncope, or syncope. Call if any intolerable side effects. Call BP readings in 2 weeks. Discussed mild LE most likely secondary to amlodipine but not necessary to stop or change at this time.

## 2020-12-11 ENCOUNTER — OFFICE VISIT (OUTPATIENT)
Dept: CARDIOLOGY | Facility: CLINIC | Age: 50
End: 2020-12-11

## 2020-12-11 VITALS
WEIGHT: 198 LBS | SYSTOLIC BLOOD PRESSURE: 128 MMHG | HEART RATE: 63 BPM | OXYGEN SATURATION: 99 % | HEIGHT: 74 IN | DIASTOLIC BLOOD PRESSURE: 64 MMHG | TEMPERATURE: 98.2 F | BODY MASS INDEX: 25.41 KG/M2

## 2020-12-11 DIAGNOSIS — I10 ESSENTIAL HYPERTENSION: Primary | Chronic | ICD-10-CM

## 2020-12-11 DIAGNOSIS — N18.31 STAGE 3A CHRONIC KIDNEY DISEASE (HCC): Chronic | ICD-10-CM

## 2020-12-11 DIAGNOSIS — E78.00 ELEVATED CHOLESTEROL: Chronic | ICD-10-CM

## 2020-12-11 PROBLEM — N18.30 CHRONIC KIDNEY DISEASE, STAGE III (MODERATE) (HCC): Chronic | Status: ACTIVE | Noted: 2019-06-27

## 2020-12-11 PROCEDURE — 99214 OFFICE O/P EST MOD 30 MIN: CPT | Performed by: NURSE PRACTITIONER

## 2020-12-11 RX ORDER — EZETIMIBE 10 MG/1
10 TABLET ORAL DAILY
COMMUNITY
Start: 2020-11-01 | End: 2021-12-17 | Stop reason: ALTCHOICE

## 2020-12-11 RX ORDER — CLONIDINE HYDROCHLORIDE 0.1 MG/1
0.1 TABLET ORAL 2 TIMES DAILY
COMMUNITY

## 2020-12-11 NOTE — ASSESSMENT & PLAN NOTE
Improved. Encouraged him to monitor BP at home and call if hypotension or consistently greater than 130/80. Encouraged continued lifestyle modifications.

## 2020-12-11 NOTE — PROGRESS NOTES
"    Subjective:     Encounter Date:2020    Chief Complaint:    Patient ID: Hilario Tomas is a 50 y.o. male here today for 6 month cardiac follow-up.     HPI     Hypertension      Additional comments: BP has been better - didn't feel well with prazosin started last visit. Hasn't checked his BP in a while at home. Was \"good\" when he checked last. Sees Dr. Dial every 3 months and has been 120/80s at those checks. Had been doing a lot better with diet and exercise.          Last edited by Amanda Edwards APRN on 2020 10:07 AM. (History)        History:   Past Medical History:   Diagnosis Date   • Arthritis    • Asthma    • Bipolar and related disorder (CMS/HCC)    • Chronic kidney disease    • Elevated cholesterol    • Gout    • Hypertension    • MRSA infection      Past Surgical History:   Procedure Laterality Date   • TOTAL HIP ARTHROPLASTY Right 2019     Social History     Socioeconomic History   • Marital status:      Spouse name: Not on file   • Number of children: Not on file   • Years of education: Not on file   • Highest education level: Not on file   Tobacco Use   • Smoking status: Former Smoker     Packs/day: 1.00     Years: 3.00     Pack years: 3.00     Types: Cigarettes, Cigars     Start date: 1989     Quit date: 1992     Years since quittin.9   • Smokeless tobacco: Former User     Types: Chew, Snuff     Quit date: 1992   Substance and Sexual Activity   • Alcohol use: No     Frequency: Never   • Drug use: No   • Sexual activity: Defer     Family History   Problem Relation Age of Onset   • Hyperlipidemia Mother    • Heart disease Mother    • Hypertension Father    • Heart disease Father    • Kidney disease Father    • Hyperlipidemia Father    • CAR disease Daughter    • Depression Son    • No Known Problems Son      Outpatient Medications Marked as Taking for the 20 encounter (Office Visit) with Amanda Edwards APRN   Medication Sig Dispense Refill "   • allopurinol (ZYLOPRIM) 300 MG tablet Take 1 tablet by mouth Daily.     • amLODIPine (NORVASC) 10 MG tablet Take 10 mg by mouth Daily.  0   • carBAMazepine (TEGretol) 200 MG tablet Take 1-2 tablets by mouth 2 (Two) Times a Day. 2 tabs am, 1 tab pm     • clonazePAM (KlonoPIN) 1 MG tablet Take 1 tablet by mouth 2 (Two) Times a Day.     • cloNIDine (CATAPRES) 0.1 MG tablet Take 0.1 mg by mouth 2 (Two) Times a Day.     • ezetimibe (ZETIA) 10 MG tablet Take 10 mg by mouth Daily.     • gemfibrozil (LOPID) 600 MG tablet Take 1 tablet by mouth Daily With Dinner.     • hydrALAZINE (APRESOLINE) 50 MG tablet Take 1 tablet by mouth 3 (Three) Times a Day. (Patient taking differently: Take 100 mg by mouth 2 (two) times a day.) 270 tablet 3   • lithium 300 MG tablet Take 1 tablet by mouth Every 12 (Twelve) Hours.     • [DISCONTINUED] cloNIDine (CATAPRES-TTS) 0.2 MG/24HR patch Place 1 patch on the skin as directed by provider 1 (One) Time Per Week.     • [DISCONTINUED] prazosin (MINIPRESS) 1 MG capsule Take 1 capsule by mouth Every Night. 30 capsule 11     Review of Systems:  Review of Systems   Constitution: Positive for malaise/fatigue (mild, improved with better heart rates) and weight loss (15 lbs in 6 months, intentional). Negative for chills, decreased appetite and fever.   HENT: Negative for congestion and nosebleeds (resolved, attributed to weather).    Eyes: Negative for blurred vision and double vision.   Cardiovascular: Negative for chest pain, dyspnea on exertion, irregular heartbeat, leg swelling (feet, better with lower doses of hydralazine), near-syncope, palpitations and syncope.   Respiratory: Negative for cough, shortness of breath (better since started exercising), sputum production and wheezing.    Endocrine: Positive for cold intolerance. Negative for heat intolerance and polydipsia.   Hematologic/Lymphatic: Bruises/bleeds easily.   Skin: Negative for dry skin, itching and rash (sometimes on legs).  "  Musculoskeletal: Positive for arthritis, back pain (lower - better after hip replacement), gout (rare joint pain), joint pain, joint swelling and stiffness (sometimes, better when stretches regularly). Negative for falls, muscle cramps and neck pain (better).   Gastrointestinal: Negative for abdominal pain, constipation, diarrhea, heartburn, melena, nausea and vomiting.   Genitourinary: Positive for frequency, nocturia (at least 2-4 times for years - he attributes to increased liquid intake due to lithium) and urgency. Negative for dysuria, hematuria and incomplete emptying.   Neurological: Positive for dizziness (when first stands sometimes worse in the mornings), headaches (not often - sinus), light-headedness, loss of balance (not all the time) and numbness (hands sometimes). Negative for excessive daytime sleepiness.   Psychiatric/Behavioral: Negative for depression. The patient has insomnia (trouble staying asleep sometimes, GENA 6/2019 was normal  ) and is nervous/anxious (worse at times - he attributes to pandemic).         Objective:   /64 (BP Location: Left arm, Patient Position: Sitting, Cuff Size: Adult)   Pulse 63   Temp 98.2 °F (36.8 °C) (Infrared)   Ht 188 cm (74\")   Wt 89.8 kg (198 lb)   SpO2 99%   BMI 25.42 kg/m²   Wt Readings from Last 3 Encounters:   12/11/20 89.8 kg (198 lb)   06/23/20 96.7 kg (213 lb 3.2 oz)   08/02/19 93.1 kg (205 lb 3.2 oz)       Vitals signs reviewed.   Constitutional:       Appearance: Well-developed.   Eyes:      General: No scleral icterus.  Neck:      Vascular: No JVD.   Pulmonary:      Effort: Pulmonary effort is normal.      Breath sounds: Normal breath sounds.   Cardiovascular:      Normal rate. Regular rhythm.      No gallop.   Pulses:     Intact distal pulses.   Skin:     General: Skin is warm and dry.      Comments: Hemosiderin staining distal bilateral lower extremities   Neurological:      Mental Status: Alert and oriented to person, place, and time. "   Psychiatric:         Mood and Affect: Mood normal.         Behavior: Behavior is cooperative.         Cognition and Memory: Cognition and memory normal.         Judgment: Judgment normal.      Comments: Slightly odd affect       Lab/Diagnostics Review:   6/22/2020 [facility]  BMP sodium 143 potassium 4.1 chloride 106 CO2 24 BUN 29 creatinine 2.1 calcium 10.2 glucose 183  HFP alk phos 74 AST 20 ALT 18 total bilirubin 0.3 total protein 7.6 albumin 4.7  Vitamin D 33.8           Lab Results   Component Value Date     WBC 8.37 06/29/2019     HGB 12.4 (L) 06/29/2019     HCT 37.5 (L) 06/29/2019     MCV 93.8 06/29/2019      06/29/2019                Lab Results   Component Value Date     GLUCOSE 126 (H) 06/29/2019     BUN 21 06/29/2019     CREATININE 1.58 (H) 06/29/2019     EGFRIFNONA 47 (L) 06/29/2019     BCR 13.3 06/29/2019     K 4.2 06/29/2019     CO2 18.0 (L) 06/29/2019     CALCIUM 10.2 06/29/2019     ALBUMIN 4.70 06/27/2019     AST 16 06/27/2019     ALT 15 06/27/2019                Lab Results   Component Value Date     TSH 2.130 06/27/2019                Lab Results   Component Value Date     TROPONINI <0.012 06/27/2019                Lab Results   Component Value Date     CHOL 185 06/27/2019     TRIG 282 (H) 06/27/2019     HDL 33 (L) 06/27/2019      (H) 06/27/2019 6/28/2019 overnight oximetry on room air desaturation event index 1.6, lowest SPO2 77%, less than 5 minutes below 90%     3/19/2019 EKG sinus bradycardia 51 bpm, possible right ventricular conduction delay, no previous available for comparison      ECG 12 Lead  Date/Time: 8/2/2019 5:34 PM  Performed by: Amanda Edwards APRN  Authorized by: Amanda Edwards APRN   Comparison: compared with previous ECG from 3/9/2019  Comparison to previous ECG: HR has increased from 51 bpm  Rhythm: sinus rhythm  Rate: normal  BPM: 75  Conduction: left anterior fascicular block  Clinical impression: abnormal EKG      ECG 12 Lead  Date/Time: 6/23/2020  10:46 AM  Performed by: Amanda Edwards APRN  Authorized by: Amanda Edwards APRN   Comparison: compared with previous ECG from 8/2/2019  Similar to previous ECG  Rhythm: sinus rhythm  Rate: normal  BPM: 67  Conduction: left anterior fascicular block  Clinical impression: abnormal EKG    Procedures: None in office today      Assessment/Plan:       Problem List Items Addressed This Visit        Cardiovascular and Mediastinum    Elevated cholesterol (Chronic)    Current Assessment & Plan     Reportedly still not to goal. Requested copy of most recent lipid panel for review. Encouraged continued healthy diet and routine aerobic exercise. Previously high triglycerides and low HDL per 6/2019 labs. Continue ezetimibe and gemfibrozil.          Relevant Medications    ezetimibe (ZETIA) 10 MG tablet    Essential hypertension - Primary (Chronic)    Current Assessment & Plan     Improved. Encouraged him to monitor BP at home and call if hypotension or consistently greater than 130/80. Encouraged continued lifestyle modifications.         Relevant Medications    cloNIDine (CATAPRES) 0.1 MG tablet       Genitourinary    Chronic kidney disease, stage III (moderate) (Chronic)    Current Assessment & Plan     Worsened per 6/2020 labs. Followed by Dr. Reid. Will continue to avoid ACEi or ARB due to potential interaction with lithium. Previously discussed that if absolutely necessary lithium dose could be monitored and decreased.             Return in about 1 year (around 12/11/2021) for Recheck.         ANUSHKA Trevizo, ACNP-BC, CHFN-BC

## 2020-12-11 NOTE — ASSESSMENT & PLAN NOTE
Reportedly still not to goal. Requested copy of most recent lipid panel for review. Encouraged continued healthy diet and routine aerobic exercise. Previously high triglycerides and low HDL per 6/2019 labs. Continue ezetimibe and gemfibrozil.

## 2020-12-11 NOTE — ASSESSMENT & PLAN NOTE
Worsened per 6/2020 labs. Followed by Dr. Reid. Will continue to avoid ACEi or ARB due to potential interaction with lithium. Previously discussed that if absolutely necessary lithium dose could be monitored and decreased.

## 2021-12-17 ENCOUNTER — PATIENT ROUNDING (BHMG ONLY) (OUTPATIENT)
Dept: CARDIOLOGY | Facility: CLINIC | Age: 51
End: 2021-12-17

## 2021-12-17 ENCOUNTER — OFFICE VISIT (OUTPATIENT)
Dept: CARDIOLOGY | Facility: CLINIC | Age: 51
End: 2021-12-17

## 2021-12-17 VITALS
OXYGEN SATURATION: 99 % | HEIGHT: 74 IN | BODY MASS INDEX: 27.18 KG/M2 | HEART RATE: 67 BPM | SYSTOLIC BLOOD PRESSURE: 178 MMHG | WEIGHT: 211.8 LBS | DIASTOLIC BLOOD PRESSURE: 100 MMHG

## 2021-12-17 DIAGNOSIS — N18.31 STAGE 3A CHRONIC KIDNEY DISEASE (HCC): Chronic | ICD-10-CM

## 2021-12-17 DIAGNOSIS — E78.00 ELEVATED CHOLESTEROL: Chronic | ICD-10-CM

## 2021-12-17 DIAGNOSIS — I10 UNCONTROLLED HYPERTENSION: Primary | ICD-10-CM

## 2021-12-17 PROCEDURE — 93000 ELECTROCARDIOGRAM COMPLETE: CPT | Performed by: NURSE PRACTITIONER

## 2021-12-17 PROCEDURE — 99214 OFFICE O/P EST MOD 30 MIN: CPT | Performed by: NURSE PRACTITIONER

## 2021-12-17 RX ORDER — DOXAZOSIN 8 MG/1
8 TABLET ORAL NIGHTLY
COMMUNITY
Start: 2021-10-14 | End: 2021-12-17 | Stop reason: SDUPTHER

## 2021-12-17 RX ORDER — DOXAZOSIN 8 MG/1
12 TABLET ORAL NIGHTLY
Qty: 45 TABLET | Refills: 11
Start: 2021-12-17 | End: 2022-02-11 | Stop reason: SDUPTHER

## 2021-12-17 NOTE — ASSESSMENT & PLAN NOTE
Worsened. Increase doxazosin to 12 mg nightly. Encouraged him to continue monitor BP at home and call if hypotension or consistently greater than 130/80. Encouraged continued lifestyle modifications. Call BP readings in 1-2 weeks and may need to increase to max dose of 16 mg daily. Call if any side effects or unable to tolerate increased dose. Would like to decrease/discontinue clonidine if possible due to side effects and rebound hypertension. Discussed risks of uncontrolled BP.

## 2021-12-17 NOTE — PATIENT INSTRUCTIONS
"Hypertension, Adult  High blood pressure (hypertension) is when the force of blood pumping through the arteries is too strong. The arteries are the blood vessels that carry blood from the heart throughout the body. Hypertension forces the heart to work harder to pump blood and may cause arteries to become narrow or stiff. Untreated or uncontrolled hypertension can cause a heart attack, heart failure, a stroke, kidney disease, and other problems.  A blood pressure reading consists of a higher number over a lower number. Ideally, your blood pressure should be below 120/80. The first (\"top\") number is called the systolic pressure. It is a measure of the pressure in your arteries as your heart beats. The second (\"bottom\") number is called the diastolic pressure. It is a measure of the pressure in your arteries as the heart relaxes.  What are the causes?  The exact cause of this condition is not known. There are some conditions that result in or are related to high blood pressure.  What increases the risk?  Some risk factors for high blood pressure are under your control. The following factors may make you more likely to develop this condition:  · Smoking.  · Having type 2 diabetes mellitus, high cholesterol, or both.  · Not getting enough exercise or physical activity.  · Being overweight.  · Having too much fat, sugar, calories, or salt (sodium) in your diet.  · Drinking too much alcohol.  Some risk factors for high blood pressure may be difficult or impossible to change. Some of these factors include:  · Having chronic kidney disease.  · Having a family history of high blood pressure.  · Age. Risk increases with age.  · Race. You may be at higher risk if you are .  · Gender. Men are at higher risk than women before age 45. After age 65, women are at higher risk than men.  · Having obstructive sleep apnea.  · Stress.  What are the signs or symptoms?  High blood pressure may not cause symptoms. Very high " blood pressure (hypertensive crisis) may cause:  · Headache.  · Anxiety.  · Shortness of breath.  · Nosebleed.  · Nausea and vomiting.  · Vision changes.  · Severe chest pain.  · Seizures.  How is this diagnosed?  This condition is diagnosed by measuring your blood pressure while you are seated, with your arm resting on a flat surface, your legs uncrossed, and your feet flat on the floor. The cuff of the blood pressure monitor will be placed directly against the skin of your upper arm at the level of your heart. It should be measured at least twice using the same arm. Certain conditions can cause a difference in blood pressure between your right and left arms.  Certain factors can cause blood pressure readings to be lower or higher than normal for a short period of time:  · When your blood pressure is higher when you are in a health care provider's office than when you are at home, this is called white coat hypertension. Most people with this condition do not need medicines.  · When your blood pressure is higher at home than when you are in a health care provider's office, this is called masked hypertension. Most people with this condition may need medicines to control blood pressure.  If you have a high blood pressure reading during one visit or you have normal blood pressure with other risk factors, you may be asked to:  · Return on a different day to have your blood pressure checked again.  · Monitor your blood pressure at home for 1 week or longer.  If you are diagnosed with hypertension, you may have other blood or imaging tests to help your health care provider understand your overall risk for other conditions.  How is this treated?  This condition is treated by making healthy lifestyle changes, such as eating healthy foods, exercising more, and reducing your alcohol intake. Your health care provider may prescribe medicine if lifestyle changes are not enough to get your blood pressure under control, and  if:  · Your systolic blood pressure is above 130.  · Your diastolic blood pressure is above 80.  Your personal target blood pressure may vary depending on your medical conditions, your age, and other factors.  Follow these instructions at home:  Eating and drinking    · Eat a diet that is high in fiber and potassium, and low in sodium, added sugar, and fat. An example eating plan is called the DASH (Dietary Approaches to Stop Hypertension) diet. To eat this way:  ? Eat plenty of fresh fruits and vegetables. Try to fill one half of your plate at each meal with fruits and vegetables.  ? Eat whole grains, such as whole-wheat pasta, brown rice, or whole-grain bread. Fill about one fourth of your plate with whole grains.  ? Eat or drink low-fat dairy products, such as skim milk or low-fat yogurt.  ? Avoid fatty cuts of meat, processed or cured meats, and poultry with skin. Fill about one fourth of your plate with lean proteins, such as fish, chicken without skin, beans, eggs, or tofu.  ? Avoid pre-made and processed foods. These tend to be higher in sodium, added sugar, and fat.  · Reduce your daily sodium intake. Most people with hypertension should eat less than 1,500 mg of sodium a day.  · Do not drink alcohol if:  ? Your health care provider tells you not to drink.  ? You are pregnant, may be pregnant, or are planning to become pregnant.  · If you drink alcohol:  ? Limit how much you use to:  § 0-1 drink a day for women.  § 0-2 drinks a day for men.  ? Be aware of how much alcohol is in your drink. In the [place] one drink equals one 12 oz bottle of beer (355 mL), one 5 oz glass of wine (148 mL), or one 1½ oz glass of hard liquor (44 mL).    Lifestyle    · Work with your health care provider to maintain a healthy body weight or to lose weight. Ask what an ideal weight is for you.  · Get at least 30 minutes of exercise most days of the week. Activities may include walking, swimming, or biking.  · Include exercise to  strengthen your muscles (resistance exercise), such as Pilates or lifting weights, as part of your weekly exercise routine. Try to do these types of exercises for 30 minutes at least 3 days a week.  · Do not use any products that contain nicotine or tobacco, such as cigarettes, e-cigarettes, and chewing tobacco. If you need help quitting, ask your health care provider.  · Monitor your blood pressure at home as told by your health care provider.  · Keep all follow-up visits as told by your health care provider. This is important.    Medicines  · Take over-the-counter and prescription medicines only as told by your health care provider. Follow directions carefully. Blood pressure medicines must be taken as prescribed.  · Do not skip doses of blood pressure medicine. Doing this puts you at risk for problems and can make the medicine less effective.  · Ask your health care provider about side effects or reactions to medicines that you should watch for.  Contact a health care provider if you:  · Think you are having a reaction to a medicine you are taking.  · Have headaches that keep coming back (recurring).  · Feel dizzy.  · Have swelling in your ankles.  · Have trouble with your vision.  Get help right away if you:  · Develop a severe headache or confusion.  · Have unusual weakness or numbness.  · Feel faint.  · Have severe pain in your chest or abdomen.  · Vomit repeatedly.  · Have trouble breathing.  Summary  · Hypertension is when the force of blood pumping through your arteries is too strong. If this condition is not controlled, it may put you at risk for serious complications.  · Your personal target blood pressure may vary depending on your medical conditions, your age, and other factors. For most people, a normal blood pressure is less than 120/80.  · Hypertension is treated with lifestyle changes, medicines, or a combination of both. Lifestyle changes include losing weight, eating a healthy, low-sodium diet,  "exercising more, and limiting alcohol.  This information is not intended to replace advice given to you by your health care provider. Make sure you discuss any questions you have with your health care provider.  Document Revised: 08/28/2019 Document Reviewed: 08/28/2019  [employer] Patient Education © 2021 [employer]      https://www.nhlbi.nih.gov/files/docs/public/heart/dash_brief.pdf\">   DASH Eating Plan  DASH stands for Dietary Approaches to Stop Hypertension. The DASH eating plan is a healthy eating plan that has been shown to:  · Reduce high blood pressure (hypertension).  · Reduce your risk for type 2 diabetes, heart disease, and stroke.  · Help with weight loss.  What are tips for following this plan?  Reading food labels  · Check food labels for the amount of salt (sodium) per serving. Choose foods with less than 5 percent of the Daily Value of sodium. Generally, foods with less than 300 milligrams (mg) of sodium per serving fit into this eating plan.  · To find whole grains, look for the word \"whole\" as the first word in the ingredient list.  Shopping  · Buy products labeled as \"low-sodium\" or \"no salt added.\"  · Buy fresh foods. Avoid canned foods and pre-made or frozen meals.  Cooking  · Avoid adding salt when cooking. Use salt-free seasonings or herbs instead of table salt or sea salt. Check with your health care provider or pharmacist before using salt substitutes.  · Do not gambino foods. Cook foods using healthy methods such as baking, boiling, grilling, roasting, and broiling instead.  · Cook with heart-healthy oils, such as olive, canola, avocado, soybean, or sunflower oil.  Meal planning    · Eat a balanced diet that includes:  ? 4 or more servings of fruits and 4 or more servings of vegetables each day. Try to fill one-half of your plate with fruits and vegetables.  ? 6-8 servings of whole grains each day.  ? Less than 6 oz (170 g) of lean meat, poultry, or fish each day. A 3-oz (85-g) serving of " meat is about the same size as a deck of cards. One egg equals 1 oz (28 g).  ? 2-3 servings of low-fat dairy each day. One serving is 1 cup (237 mL).  ? 1 serving of nuts, seeds, or beans 5 times each week.  ? 2-3 servings of heart-healthy fats. Healthy fats called omega-3 fatty acids are found in foods such as walnuts, flaxseeds, fortified milks, and eggs. These fats are also found in cold-water fish, such as sardines, salmon, and mackerel.  · Limit how much you eat of:  ? Canned or prepackaged foods.  ? Food that is high in trans fat, such as some fried foods.  ? Food that is high in saturated fat, such as fatty meat.  ? Desserts and other sweets, sugary drinks, and other foods with added sugar.  ? Full-fat dairy products.  · Do not salt foods before eating.  · Do not eat more than 4 egg yolks a week.  · Try to eat at least 2 vegetarian meals a week.  · Eat more home-cooked food and less restaurant, buffet, and fast food.    Lifestyle  · When eating at a restaurant, ask that your food be prepared with less salt or no salt, if possible.  · If you drink alcohol:  ? Limit how much you use to:  § 0-1 drink a day for women who are not pregnant.  § 0-2 drinks a day for men.  ? Be aware of how much alcohol is in your drink. In the [place] one drink equals one 12 oz bottle of beer (355 mL), one 5 oz glass of wine (148 mL), or one 1½ oz glass of hard liquor (44 mL).  General information  · Avoid eating more than 2,300 mg of salt a day. If you have hypertension, you may need to reduce your sodium intake to 1,500 mg a day.  · Work with your health care provider to maintain a healthy body weight or to lose weight. Ask what an ideal weight is for you.  · Get at least 30 minutes of exercise that causes your heart to beat faster (aerobic exercise) most days of the week. Activities may include walking, swimming, or biking.  · Work with your health care provider or dietitian to adjust your eating plan to your individual calorie  needs.  What foods should I eat?  Fruits  All fresh, dried, or frozen fruit. Canned fruit in natural juice (without added sugar).  Vegetables  Fresh or frozen vegetables (raw, steamed, roasted, or grilled). Low-sodium or reduced-sodium tomato and vegetable juice. Low-sodium or reduced-sodium tomato sauce and tomato paste. Low-sodium or reduced-sodium canned vegetables.  Grains  Whole-grain or whole-wheat bread. Whole-grain or whole-wheat pasta. Brown rice. Oatmeal. Quinoa. Bulgur. Whole-grain and low-sodium cereals. Tamra bread. Low-fat, low-sodium crackers. Whole-wheat flour tortillas.  Meats and other proteins  Skinless chicken or turkey. Ground chicken or turkey. Pork with fat trimmed off. Fish and seafood. Egg whites. Dried beans, peas, or lentils. Unsalted nuts, nut butters, and seeds. Unsalted canned beans. Lean cuts of beef with fat trimmed off. Low-sodium, lean precooked or cured meat, such as sausages or meat loaves.  Dairy  Low-fat (1%) or fat-free (skim) milk. Reduced-fat, low-fat, or fat-free cheeses. Nonfat, low-sodium ricotta or cottage cheese. Low-fat or nonfat yogurt. Low-fat, low-sodium cheese.  Fats and oils  Soft margarine without trans fats. Vegetable oil. Reduced-fat, low-fat, or light mayonnaise and salad dressings (reduced-sodium). Canola, safflower, olive, avocado, soybean, and sunflower oils. Avocado.  Seasonings and condiments  Herbs. Spices. Seasoning mixes without salt.  Other foods  Unsalted popcorn and pretzels. Fat-free sweets.  The items listed above may not be a complete list of foods and beverages you can eat. Contact a dietitian for more information.  What foods should I avoid?  Fruits  Canned fruit in a light or heavy syrup. Fried fruit. Fruit in cream or butter sauce.  Vegetables  Creamed or fried vegetables. Vegetables in a cheese sauce. Regular canned vegetables (not low-sodium or reduced-sodium). Regular canned tomato sauce and paste (not low-sodium or reduced-sodium). Regular  tomato and vegetable juice (not low-sodium or reduced-sodium). Pickles. Olives.  Grains  Baked goods made with fat, such as croissants, muffins, or some breads. Dry pasta or rice meal packs.  Meats and other proteins  Fatty cuts of meat. Ribs. Fried meat. Hernández. Bologna, salami, and other precooked or cured meats, such as sausages or meat loaves. Fat from the back of a pig (fatback). Bratwurst. Salted nuts and seeds. Canned beans with added salt. Canned or smoked fish. Whole eggs or egg yolks. Chicken or turkey with skin.  Dairy  Whole or 2% milk, cream, and half-and-half. Whole or full-fat cream cheese. Whole-fat or sweetened yogurt. Full-fat cheese. Nondairy creamers. Whipped toppings. Processed cheese and cheese spreads.  Fats and oils  Butter. Stick margarine. Lard. Shortening. Ghee. Hernández fat. Tropical oils, such as coconut, palm kernel, or palm oil.  Seasonings and condiments  Onion salt, garlic salt, seasoned salt, table salt, and sea salt. Worcestershire sauce. Tartar sauce. Barbecue sauce. Teriyaki sauce. Soy sauce, including reduced-sodium. Steak sauce. Canned and packaged gravies. Fish sauce. Oyster sauce. Cocktail sauce. Store-bought horseradish. Ketchup. Mustard. Meat flavorings and tenderizers. Bouillon cubes. Hot sauces. Pre-made or packaged marinades. Pre-made or packaged taco seasonings. Relishes. Regular salad dressings.  Other foods  Salted popcorn and pretzels.  The items listed above may not be a complete list of foods and beverages you should avoid. Contact a dietitian for more information.  Where to find more information  · National Heart, Lung, and Blood [place]: www.nhlbi.nih.gov  · American Heart Association: www.heart.org  · Academy of Nutrition and Dietetics: www.eatright.org  · National Kidney Foundation: www.kidney.org  Summary  · The DASH eating plan is a healthy eating plan that has been shown to reduce high blood pressure (hypertension). It may also reduce your risk for type 2  diabetes, heart disease, and stroke.  · When on the DASH eating plan, aim to eat more fresh fruits and vegetables, whole grains, lean proteins, low-fat dairy, and heart-healthy fats.  · With the DASH eating plan, you should limit salt (sodium) intake to 2,300 mg a day. If you have hypertension, you may need to reduce your sodium intake to 1,500 mg a day.  · Work with your health care provider or dietitian to adjust your eating plan to your individual calorie needs.  This information is not intended to replace advice given to you by your health care provider. Make sure you discuss any questions you have with your health care provider.  Document Revised: 11/20/2020 Document Reviewed: 11/20/2020  Elsevier Patient Education © 2021 [employer]
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
## 2021-12-17 NOTE — ASSESSMENT & PLAN NOTE
Almost to goal per 7/2021 lipid panel except slightly low HDL (36). Encouraged healthy diet, weight loss, and routine aerobic exercise. Previously high triglycerides and low HDL per 6/2019 labs. Continue  gemfibrozil. Consider restarting Zetia if uncontrolled with next lipid panel since rash didn't completely clear after stopping Zetia.

## 2021-12-17 NOTE — ASSESSMENT & PLAN NOTE
Continues to worsen (Cr 2.0 11/2021). Followed by Dr. Reid. Will continue to avoid ACEi or ARB due to potential interaction with lithium. If absolutely necessary lithium dose could be monitored and decreased.

## 2021-12-17 NOTE — PROGRESS NOTES
December 17, 2021    Hello, may I speak with Hilario Tomas?    [facility] Lead      I am  with [facility]  [address] [facility]  [place] [address] [phone].    Before we get started may I verify your date of birth? 1970    I am calling to  ask about your recent visit. Is this a good time to talk? yes    Tell me about your visit with us. What things went well?  It was a good visit.        We're always looking for ways to make our patients' experiences even better. Do you have recommendations on ways we may improve?  no    Overall were you satisfied with your  visit to our practice? yes       I appreciate you taking the time to speak with me today. Is there anything else I can do for you? no      Thank you, and have a great day.

## 2022-02-11 ENCOUNTER — OFFICE VISIT (OUTPATIENT)
Dept: CARDIOLOGY | Facility: CLINIC | Age: 52
End: 2022-02-11

## 2022-02-11 VITALS
HEIGHT: 74 IN | SYSTOLIC BLOOD PRESSURE: 162 MMHG | HEART RATE: 74 BPM | WEIGHT: 205.4 LBS | BODY MASS INDEX: 26.36 KG/M2 | DIASTOLIC BLOOD PRESSURE: 80 MMHG | OXYGEN SATURATION: 99 %

## 2022-02-11 DIAGNOSIS — I10 UNCONTROLLED HYPERTENSION: ICD-10-CM

## 2022-02-11 PROCEDURE — 99214 OFFICE O/P EST MOD 30 MIN: CPT | Performed by: NURSE PRACTITIONER

## 2022-02-11 RX ORDER — DOXAZOSIN 8 MG/1
16 TABLET ORAL NIGHTLY
Qty: 60 TABLET | Refills: 11 | Status: SHIPPED | OUTPATIENT
Start: 2022-02-11 | End: 2023-02-11

## 2022-02-11 NOTE — PROGRESS NOTES
Encounter Date:02/11/2022  Chief Complaint:   Subjective    Hilario Tomas is a 51 y.o. male who presents to [facility] [place] today for two month cardiac follow-up of uncontrolled hypertension.      History of Present Illness   HPI     Hypertension      Additional comments: was better in December after increasing doxazosin but trending up a little - worsened by stress and not exercising regularly. Has lost 6 lbs.          Last edited by Amanda Edwards APRN on 2/11/2022  2:13 PM. (History)        History: Past medical, surgical, family, and social history reviewed.    Outpatient Medications Marked as Taking for the 2/11/22 encounter (Office Visit) with Amanda Edwrads APRN   Medication Sig Dispense Refill   • allopurinol (ZYLOPRIM) 300 MG tablet Take 1 tablet by mouth Daily.     • amLODIPine (NORVASC) 10 MG tablet Take 10 mg by mouth Daily.  0   • carBAMazepine (TEGretol) 200 MG tablet Take 1-2 tablets by mouth 2 (Two) Times a Day. 2 tabs am, 1 tab pm     • clonazePAM (KlonoPIN) 1 MG tablet Take 1 tablet by mouth 2 (Two) Times a Day.     • cloNIDine (CATAPRES) 0.1 MG tablet Take 0.1 mg by mouth 3 (Three) Times a Day. 2 AT BEDTIME AND 1 IN THE MORNING     • doxazosin (CARDURA) 8 MG tablet Take 2 tablets by mouth Every Night. 60 tablet 11   • gemfibrozil (LOPID) 600 MG tablet Take 1 tablet by mouth Daily With Dinner.     • hydrALAZINE (APRESOLINE) 50 MG tablet Take 1 tablet by mouth 3 (Three) Times a Day. (Patient taking differently: Take 200 mg by mouth 2 (Two) Times a Day.) 270 tablet 3   • lithium 300 MG tablet Take 1 tablet by mouth Every 12 (Twelve) Hours.     • vitamin D3 (Vitamin D) 125 MCG (5000 UT) capsule capsule Take 5,000 Units by mouth Daily.     • [DISCONTINUED] doxazosin (CARDURA) 8 MG tablet Take 1.5 tablets by mouth Every Night. (Patient taking differently: Take 14 mg by mouth Every Night. Increased to 14 mg last visit) 45 tablet 11        Objective     Vital  "Signs:   /80 (BP Location: Left arm, Patient Position: Sitting, Cuff Size: Adult)   Pulse 74   Ht 188 cm (74.02\")   Wt 93.2 kg (205 lb 6.4 oz)   SpO2 99%   BMI 26.36 kg/m²   Wt Readings from Last 3 Encounters:   02/11/22 93.2 kg (205 lb 6.4 oz)   12/17/21 96.1 kg (211 lb 12.8 oz)   12/11/20 89.8 kg (198 lb)         Vitals reviewed.   Constitutional:       Appearance: Well-developed.   Eyes:      General: No scleral icterus.  Neck:      Vascular: No JVD.   Pulmonary:      Effort: Pulmonary effort is normal.   Cardiovascular:      Normal rate. Regular rhythm.      No gallop.   Pulses:     Intact distal pulses.   Skin:     General: Skin is warm and dry.      Comments: Hemosiderin staining distal bilateral lower extremities   Neurological:      Mental Status: Alert and oriented to person, place, and time.   Psychiatric:         Mood and Affect: Mood normal.         Behavior: Behavior is cooperative.         Cognition and Memory: Cognition and memory normal.         Judgment: Judgment normal.         Result Review  Data Reviewed:  The following data was reviewed by: ANUSHKA Rollins on 02/11/2022  Scan on 11/3/2021 by Amanda Edwards APRN: PTH/PHOS/MICROAL/CMP/MG/CBC/ [facility] MD/ 11-3-2021                  Assessment and Plan   Problem List Items Addressed This Visit        Cardiac and Vasculature    Uncontrolled hypertension (Chronic)    Overview     Has tried clonidine patch due to dry mouth with pills. Has difficulty tolerating different medications or has drug to drug interactions with lithium. Didn't tolerate prazosin.         Current Assessment & Plan     Improved but not to goal. Increase doxazosin to 16 mg nightly. Encouraged him to continue monitor BP at home and call if hypotension or consistently greater than 130/80. Encouraged lifestyle modifications. Continue to monitor BP and record readings. Call if any side effects or unable to tolerate increased dose. I would like to " decrease/discontinue clonidine in the future if possible due to side effects and rebound hypertension. Discussed risks of uncontrolled BP.         Relevant Medications    doxazosin (CARDURA) 8 MG tablet        Patient was given instructions and counseling regarding his condition or for health maintenance advice. Please see specific information pulled into the AVS if appropriate.    Follow Up :   Return in about 3 months (around 5/11/2022) for Recheck.             Amanda Edwards APRN, ACNP-BC, CHFN-BC

## 2022-05-13 ENCOUNTER — OFFICE VISIT (OUTPATIENT)
Dept: CARDIOLOGY | Facility: CLINIC | Age: 52
End: 2022-05-13

## 2022-05-13 VITALS
HEART RATE: 69 BPM | DIASTOLIC BLOOD PRESSURE: 78 MMHG | WEIGHT: 210.6 LBS | HEIGHT: 74 IN | BODY MASS INDEX: 27.03 KG/M2 | OXYGEN SATURATION: 99 % | SYSTOLIC BLOOD PRESSURE: 150 MMHG

## 2022-05-13 DIAGNOSIS — N18.31 STAGE 3A CHRONIC KIDNEY DISEASE: Chronic | ICD-10-CM

## 2022-05-13 DIAGNOSIS — E78.00 ELEVATED CHOLESTEROL: Chronic | ICD-10-CM

## 2022-05-13 DIAGNOSIS — I10 ESSENTIAL HYPERTENSION: ICD-10-CM

## 2022-05-13 DIAGNOSIS — I10 UNCONTROLLED HYPERTENSION: Primary | Chronic | ICD-10-CM

## 2022-05-13 PROCEDURE — 99214 OFFICE O/P EST MOD 30 MIN: CPT | Performed by: NURSE PRACTITIONER

## 2022-05-13 RX ORDER — ISOSORBIDE MONONITRATE 30 MG/1
30 TABLET, EXTENDED RELEASE ORAL DAILY
COMMUNITY

## 2022-05-13 RX ORDER — HYDRALAZINE HYDROCHLORIDE 100 MG/1
200 TABLET, FILM COATED ORAL 2 TIMES DAILY
Qty: 360 TABLET | Refills: 3
Start: 2022-05-13 | End: 2023-05-13

## 2022-05-13 NOTE — ASSESSMENT & PLAN NOTE
Improving per home BP log. Advised him to try decreasing clonidine to 0.1 mg twice a day and then decreasing to as needed only for sustained BP greater than 160/100. Discussed possibility of having rebound hypertension and to only check BP after resting for 10-15 minutes. May need to check for renal artery stenosis and/or pheochromocytoma if not checked by nephrology or Dr. Dial. Encouraged weight loss, routine aerobic activity, and stress reduction/relaxation techniques.

## 2022-05-13 NOTE — ASSESSMENT & PLAN NOTE
Almost to goal per 7/2021 lipid panel except slightly low HDL (36). Encouraged healthy diet, weight loss, and routine aerobic exercise. Previously high triglycerides and low HDL per 6/2019 labs. Continue  gemfibrozil. Consider restarting Zetia if uncontrolled with next lipid panel since rash didn't completely clear initially after stopping Zetia.

## 2022-05-13 NOTE — PROGRESS NOTES
Called Dr. Carr's office, spoke to Gilberto- nurse aid, who stated dr carr isnt there today but no testing has been done to check for either that she can see. Last renal us was done 2019. She is faxing last labs from 3/2022. Thx!   No

## 2022-05-13 NOTE — PROGRESS NOTES
Encounter Date:05/13/2022  Chief Complaint:   Subjective    Hilario Tomas is a 51 y.o. male who presents to [facility] [place] today for three month cardiac follow-up.      History of Present Illness   HPI     uncontrolled hypertension      Additional comments: Improving per home BP log especially since Dr. Dial added Imdur 30 mg daily but he's been having headaches which have improved but not resolved. He reports the headaches are tolerable/controlled with Tylenol. Saw a little improvement with increase in doxazosin to 16 mg last visit. Still taking clonidine 0.1 mg am and 0.2 mg pm, amlodipine 10 mg, and hydralazine 200 mg twice a day. Clonidine makes him drowzy. Has worn clonidine patch in the past but had trouble with getting from his pharmacy on time.              farxiga      Additional comments: Wants to know if would be indicated or ok for him to take.              Hyperlipidemia      Additional comments:  and HDL 36 7/2021 on gemfibrozil.          Last edited by Amanda Edwards APRN on 5/13/2022  2:10 PM. (History)        History: Past medical, surgical, family, and social history reviewed.    Outpatient Medications Marked as Taking for the 5/13/22 encounter (Office Visit) with Amanda Edwards APRN   Medication Sig Dispense Refill   • allopurinol (ZYLOPRIM) 300 MG tablet Take 1 tablet by mouth Daily.     • amLODIPine (NORVASC) 10 MG tablet Take 10 mg by mouth Daily.  0   • carBAMazepine (TEGretol) 200 MG tablet Take 1-2 tablets by mouth 2 (Two) Times a Day. 2 tabs am, 1 tab pm     • clonazePAM (KlonoPIN) 1 MG tablet Take 1 tablet by mouth 2 (Two) Times a Day.     • cloNIDine (CATAPRES) 0.1 MG tablet Take 0.1 mg by mouth 3 (Three) Times a Day. Twice a day and then only if SBP greater than 180 or DBP greater than 100     • doxazosin (CARDURA) 8 MG tablet Take 2 tablets by mouth Every Night. 60 tablet 11   • gemfibrozil (LOPID) 600 MG tablet Take 1 tablet by mouth  "Daily With Dinner.     • hydrALAZINE (APRESOLINE) 50 MG tablet Take 1 tablet by mouth 3 (Three) Times a Day. (Patient taking differently: Take 200 mg by mouth 2 (Two) Times a Day.) 270 tablet 3   • isosorbide mononitrate (IMDUR) 30 MG 24 hr tablet Take 30 mg by mouth Daily. By PCP for BP     • lithium 300 MG tablet Take 1 tablet by mouth Every 12 (Twelve) Hours.     • vitamin D3 125 MCG (5000 UT) capsule capsule Take 5,000 Units by mouth Daily.          Objective     Vital Signs:   /78 (BP Location: Left arm, Patient Position: Sitting, Cuff Size: Adult)   Pulse 69   Ht 188 cm (74.02\")   Wt 95.5 kg (210 lb 9.6 oz)   SpO2 99%   BMI 27.03 kg/m²   Wt Readings from Last 3 Encounters:   05/13/22 95.5 kg (210 lb 9.6 oz)   02/11/22 93.2 kg (205 lb 6.4 oz)   12/17/21 96.1 kg (211 lb 12.8 oz)         Vitals reviewed.   Constitutional:       Appearance: Well-developed.   Eyes:      General: No scleral icterus.  Neck:      Vascular: No JVD.   Pulmonary:      Effort: Pulmonary effort is normal.   Cardiovascular:      Normal rate. Regular rhythm.      No gallop.   Pulses:     Intact distal pulses.   Edema:     Ankle: bilateral 1+ pitting edema of the ankle.  Skin:     General: Skin is warm and dry.      Comments: Hemosiderin staining distal bilateral lower extremities   Neurological:      Mental Status: Alert and oriented to person, place, and time.   Psychiatric:         Mood and Affect: Mood normal.         Behavior: Behavior is cooperative.         Cognition and Memory: Cognition and memory normal.         Judgment: Judgment normal.         Result Review  Data Reviewed:  The following data was reviewed by: ANUSHKA Rollins on 05/13/2022  Scan on 3/14/2022 by Amanda Edwards APRN: PTH/ PHOS/ [facility] MD/ 3-   Scan on 3/14/2022 by Amanda Edwards APRN: CMP/URIC ACID/MG/UA/CBC/ [facility] MD/ 3-                  Assessment and Plan   Problem List Items Addressed This Visit        Cardiac and " Vasculature    Uncontrolled hypertension - Primary (Chronic)    Overview     Has tried clonidine patch due to dry mouth with pills. Has difficulty tolerating different medications or has drug to drug interactions with lithium. Didn't tolerate prazosin.           Current Assessment & Plan     Improving per home BP log. Advised him to try decreasing clonidine to 0.1 mg twice a day and then decreasing to as needed only for sustained BP greater than 160/100. Discussed possibility of having rebound hypertension and to only check BP after resting for 10-15 minutes. May need to check for renal artery stenosis and/or pheochromocytoma if not checked by nephrology or Dr. Dial. Encouraged weight loss, routine aerobic activity, and stress reduction/relaxation techniques.           Elevated cholesterol (Chronic)    Current Assessment & Plan     Almost to goal per 7/2021 lipid panel except slightly low HDL (36). Encouraged healthy diet, weight loss, and routine aerobic exercise. Previously high triglycerides and low HDL per 6/2019 labs. Continue  gemfibrozil. Consider restarting Zetia if uncontrolled with next lipid panel since rash didn't completely clear initially after stopping Zetia.              Genitourinary and Reproductive     Chronic kidney disease, stage III (moderate) (Chronic)    Current Assessment & Plan     Stable. Followed by Dr. Reid. Will continue to avoid ACEi or ARB due to potential interaction with lithium. If absolutely necessary lithium dose could be monitored and decreased. Reasonable to try Farxiga to decrease progression of CKD if ok with Dr. Reid - advised pt could possibly increase lithium levels. Will check to make sure renal artery stenosis and pheochromocytoma have been ruled out.               Patient was given instructions and counseling regarding his condition or for health maintenance advice. Please see specific information pulled into the AVS if appropriate.    Follow Up :   Return in  about 3 months (around 8/13/2022) for Recheck.             Amanda Edwards, APRN, ACNP-BC, CHFN-BC

## 2022-05-13 NOTE — ASSESSMENT & PLAN NOTE
Stable. Followed by Dr. Reid. Will continue to avoid ACEi or ARB due to potential interaction with lithium. If absolutely necessary lithium dose could be monitored and decreased. Reasonable to try Farxiga to decrease progression of CKD if ok with Dr. Reid - advised pt could possibly increase lithium levels. Will check to make sure renal artery stenosis and pheochromocytoma have been ruled out.

## 2022-05-25 NOTE — PROGRESS NOTES
Spoke to Ira at [facility]> she took a message and stated she would notify dr mitchell/ nurse and have them return my call  5-25-22

## 2022-05-27 NOTE — PROGRESS NOTES
I have called pcp and left messages for someone to call me back to no avail. May need to proceed with ordering testing. Thx!

## 2022-08-12 ENCOUNTER — OFFICE VISIT (OUTPATIENT)
Dept: CARDIOLOGY | Facility: CLINIC | Age: 52
End: 2022-08-12

## 2022-08-12 VITALS
BODY MASS INDEX: 26.77 KG/M2 | OXYGEN SATURATION: 99 % | DIASTOLIC BLOOD PRESSURE: 62 MMHG | HEART RATE: 65 BPM | SYSTOLIC BLOOD PRESSURE: 132 MMHG | HEIGHT: 74 IN | WEIGHT: 208.6 LBS

## 2022-08-12 DIAGNOSIS — R01.1 MURMUR: ICD-10-CM

## 2022-08-12 DIAGNOSIS — E78.00 ELEVATED CHOLESTEROL: Chronic | ICD-10-CM

## 2022-08-12 DIAGNOSIS — I10 UNCONTROLLED HYPERTENSION: Primary | Chronic | ICD-10-CM

## 2022-08-12 DIAGNOSIS — N18.31 STAGE 3A CHRONIC KIDNEY DISEASE: Chronic | ICD-10-CM

## 2022-08-12 PROCEDURE — 99214 OFFICE O/P EST MOD 30 MIN: CPT | Performed by: NURSE PRACTITIONER

## 2022-08-12 PROCEDURE — 93000 ELECTROCARDIOGRAM COMPLETE: CPT | Performed by: NURSE PRACTITIONER

## 2022-08-12 RX ORDER — FERROUS SULFATE 325(65) MG
325 TABLET ORAL
COMMUNITY

## 2022-08-12 NOTE — ASSESSMENT & PLAN NOTE
Almost to goal per 7/2021 and 7/2022 lipid panels except slightly low HDL. Encouraged healthy diet, weight loss, and routine aerobic exercise. Previously high triglycerides and low HDL per 6/2019 labs. Continue  gemfibrozil. Consider restarting Zetia if uncontrolled with next lipid panel since rash didn't completely clear initially after stopping Zetia.

## 2022-08-12 NOTE — ASSESSMENT & PLAN NOTE
Much better controlled with current therapy. Continue to wean clonidine if possible. Appreciate nephrology and Dr. Dial's assistance. Encouraged healthy diet and gradual increase in activity. Will check for renal artery stenosis and/or pheochromocytoma if not done in the past. Will check with Dr. Dial's office - reportedly not done by Dr. Reid's office.

## 2022-08-12 NOTE — ASSESSMENT & PLAN NOTE
Remains stable. Followed by Dr. Reid. Will continue to avoid ACEi or ARB due to potential interaction with lithium. If absolutely necessary lithium dose could be monitored and decreased. Reasonable to try Farxiga to decrease progression of CKD if ok with Dr. Reid - advised pt could possibly increase lithium levels - he chose to not start due to risk of lithium toxicity. Will check again to see if renal artery stenosis and/or pheochromocytoma ruled out.

## 2022-08-15 NOTE — PROGRESS NOTES
Please try one more time with [facility], and if we don't hear back from them see if he wants to proceed with testing. TX!

## 2022-08-15 NOTE — PROGRESS NOTES
My last note from Dr Reid's office stated they did not have anything pertaining to that or recent testing. I never heard back from D after multiple attempts- that was my last note that I made.

## 2022-09-07 ENCOUNTER — OUTSIDE FACILITY SERVICE (OUTPATIENT)
Dept: CARDIOLOGY | Facility: CLINIC | Age: 52
End: 2022-09-07

## 2022-09-07 PROCEDURE — 93306 TTE W/DOPPLER COMPLETE: CPT | Performed by: INTERNAL MEDICINE

## 2022-09-09 DIAGNOSIS — I10 UNCONTROLLED HYPERTENSION: Chronic | ICD-10-CM

## 2022-09-09 DIAGNOSIS — R01.1 MURMUR: ICD-10-CM

## 2022-09-09 NOTE — PROGRESS NOTES
Please notify patient he has mild aortic valve regurgitation but no severe valvular heart disease per echo. His LV EF is normal at 65-70%. Will recheck echo in 3-5 years - sooner if has palpitations, shortness of breath, chest discomfort, or swelling. TX!

## 2023-02-10 ENCOUNTER — OFFICE VISIT (OUTPATIENT)
Dept: CARDIOLOGY | Facility: CLINIC | Age: 53
End: 2023-02-10
Payer: COMMERCIAL

## 2023-02-10 ENCOUNTER — TELEPHONE (OUTPATIENT)
Dept: CARDIOLOGY | Facility: CLINIC | Age: 53
End: 2023-02-10

## 2023-02-10 VITALS
DIASTOLIC BLOOD PRESSURE: 88 MMHG | SYSTOLIC BLOOD PRESSURE: 160 MMHG | BODY MASS INDEX: 27.05 KG/M2 | OXYGEN SATURATION: 99 % | HEIGHT: 74 IN | WEIGHT: 210.8 LBS | HEART RATE: 72 BPM

## 2023-02-10 DIAGNOSIS — I10 UNCONTROLLED HYPERTENSION: Primary | Chronic | ICD-10-CM

## 2023-02-10 DIAGNOSIS — I35.1 AORTIC VALVE INSUFFICIENCY, ETIOLOGY OF CARDIAC VALVE DISEASE UNSPECIFIED: ICD-10-CM

## 2023-02-10 DIAGNOSIS — D64.9 ANEMIA, UNSPECIFIED TYPE: ICD-10-CM

## 2023-02-10 DIAGNOSIS — N18.32 STAGE 3B CHRONIC KIDNEY DISEASE: Chronic | ICD-10-CM

## 2023-02-10 DIAGNOSIS — E78.00 ELEVATED CHOLESTEROL: Chronic | ICD-10-CM

## 2023-02-10 PROCEDURE — 99214 OFFICE O/P EST MOD 30 MIN: CPT | Performed by: NURSE PRACTITIONER

## 2023-02-10 NOTE — ASSESSMENT & PLAN NOTE
Slight worsening per 10/22 labs. Followed by Dr. Reid. Continue to avoid ACEi or ARB due to potential interaction with lithium. If absolutely necessary lithium dose could be monitored and decreased. Reasonable to try Farxiga to decrease progression of CKD if ok with Dr. Reid - advised pt could possibly increase lithium levels - he chose to not start due to risk of lithium toxicity. Will check for renal artery stenosis and/or pheochromocytoma - unable to obtain any records that this has been done in the past.

## 2023-02-10 NOTE — ASSESSMENT & PLAN NOTE
Worsened per 10/22 labs. Unclear if has been worked up. Hasn't been adherent with iron supplement. He denies BRRB or melena. Encouraged him to resume iron supplementation and take regularly. He is aware may be related to CKD. Nephrology is following. He anticipates having repeat labs soon.

## 2023-02-10 NOTE — PROGRESS NOTES
Notified patient. Voiced understanding. He also stated he would try to do this next weekend- so he can do the collection at home instead of during a workday. I told him that would probably be fine with you. Thx

## 2023-02-10 NOTE — PROGRESS NOTES
Encounter Date:02/10/2023  Chief Complaint:   Subjective    Hilario Tomas is a 52 y.o. male who presents to [facility] [place] today for six month cardiac follow-up.      History of Present Illness   HPI     Hyperlipidemia     Additional comments: Fair control per 7/2022 on gemfibrozil. Hasn't been exercising regularly.           Hypertension     Additional comments: Usually 120-140s/70-80s at other offices. Hasn't been able to check at home due to her wife's back surgeries (3) in November - he's been her caregiver. She is doing better now but he hasn't gotten back into checking. Some dizziness when he first stands up. Sees Dr. Reid regularly for CKD.           Follow-up     Additional comments: 6 MO FU           Anemia     Additional comments: Hgb 9.8/Hct 31.7% per 10/2022 labs down from 11.5 7/22. Unclear etiology - hasn't been feeling as well as normal. Hasn't noticed any BRRB or dark, tarry stools. CKD worsening. Hasn't been taking iron regularly - was trying to take before dinner but has been forgetting.           Last edited by Amanda Edwards APRN on 2/10/2023  1:28 PM.        History: Past medical, surgical, family, and social history reviewed.    Outpatient Medications Marked as Taking for the 2/10/23 encounter (Office Visit) with Amanda Edwards APRN   Medication Sig Dispense Refill   • allopurinol (ZYLOPRIM) 300 MG tablet Take 1 tablet by mouth Daily.     • amLODIPine (NORVASC) 10 MG tablet Take 10 mg by mouth Daily.  0   • carBAMazepine (TEGretol) 200 MG tablet Take 1-2 tablets by mouth 2 (Two) Times a Day. 2 tabs am, 1 tab pm     • clonazePAM (KlonoPIN) 1 MG tablet Take 1 tablet by mouth 2 (Two) Times a Day.     • cloNIDine (CATAPRES) 0.1 MG tablet Take 0.1 mg by mouth 2 (Two) Times a Day. 1 in am and 1 in pm     • doxazosin (CARDURA) 8 MG tablet Take 2 tablets by mouth Every Night. 60 tablet 11   • ferrous sulfate 325 (65 FE) MG tablet Take 325 mg by mouth Daily  "With Breakfast. Hasn't been taking regularly     • gemfibrozil (LOPID) 600 MG tablet Take 1 tablet by mouth Daily With Dinner.     • hydrALAZINE (APRESOLINE) 100 MG tablet Take 2 tablets by mouth 2 (Two) Times a Day. (Patient taking differently: Take 200 mg by mouth 2 (Two) Times a Day. 2 tablets BID) 360 tablet 3   • isosorbide mononitrate (IMDUR) 30 MG 24 hr tablet Take 30 mg by mouth Daily. By PCP for BP     • lithium 300 MG tablet Take 1 tablet by mouth Every 12 (Twelve) Hours.     • vitamin D3 125 MCG (5000 UT) capsule capsule Take 5,000 Units by mouth Daily. Unsure of dose          Objective     Vital Signs:   /88 (BP Location: Left arm, Patient Position: Sitting, Cuff Size: Adult)   Pulse 72   Ht 188 cm (74.02\")   Wt 95.6 kg (210 lb 12.8 oz)   SpO2 99%   BMI 27.05 kg/m²   Wt Readings from Last 3 Encounters:   02/10/23 95.6 kg (210 lb 12.8 oz)   08/12/22 94.6 kg (208 lb 9.6 oz)   05/13/22 95.5 kg (210 lb 9.6 oz)         Vitals reviewed.   Constitutional:       Appearance: Well-developed.   Eyes:      General: No scleral icterus.  Neck:      Vascular: No JVD.   Pulmonary:      Effort: Pulmonary effort is normal.   Cardiovascular:      Normal rate. Regular rhythm.      Murmurs: There is a grade 1/6 harsh midsystolic murmur at the URSB.      No gallop.   Pulses:     Intact distal pulses.   Edema:     Ankle: bilateral 1+ pitting edema of the ankle.  Skin:     General: Skin is warm and dry.      Comments: Hemosiderin staining distal bilateral lower extremities   Neurological:      Mental Status: Alert and oriented to person, place, and time.   Psychiatric:         Behavior: Behavior is cooperative.         Cognition and Memory: Cognition and memory normal.         Judgment: Judgment normal.         Result Review  Data Reviewed:  The following data was reviewed by: ANUSHKA Rollins on 02/10/2023  Scan on 10/26/2022 by Amanda Edwards APRN: PTH/URINE/PHOS/UA/ [facility]/ 10-  Scan on " 10/26/2022 by Amanda Edwards APRN: MG/CMP/A1C/CBC/ [facility] MD/ 10-  Scan on 9/7/2022 by Dillon Joshi MD: [facility] ECHO 9-7-2022                 Assessment and Plan   Problem List Items Addressed This Visit        Cardiac and Vasculature    Uncontrolled hypertension - Primary (Chronic)    Overview     Has tried clonidine patch due to dry mouth with pills. Has difficulty tolerating different medications or has drug to drug interactions with lithium. Didn't tolerate prazosin.         Current Assessment & Plan     Much better controlled with current therapy. Continue to wean clonidine if possible - take only if sustained SBP greater than 160. Appreciate nephrology and Dr. Dial's assistance. Encouraged healthy diet and gradual increase in activity. Unable to verify if checked for renal artery stenosis or pheochromocytoma. Will check renal artery US (would prefer Renal artery CTA but will avoid contrast given worsened renal function) and check 24 Hr urine for catecholamines, metanephrines, and VMA. Notified patient of foods and medications to avoid for 72 hours before and during 24 hour urine collection. Encouraged routine home BP monitoring.         Relevant Orders    Catecholamine+VMA, 24-Hr Urine - Urine, Clean Catch    Metanephrines, Urine, 24 Hour - Urine, Clean Catch    Duplex Renal Artery - Bilateral Complete CAR    Elevated cholesterol (Chronic)    Current Assessment & Plan     Almost to goal per 7/2021 lipid panel except slightly low HDL (36). Encouraged healthy diet, weight loss, and routine aerobic exercise. Previously high triglycerides and low HDL per 6/2019 labs. Continue gemfibrozil. Consider restarting Zetia if uncontrolled with next lipid panel since rash didn't completely clear after stopping Zetia.         Aortic regurgitation    Overview     Mild 9/2022 echo, EF 65-70%, mild LAE, normal RV size and function         Current Assessment & Plan     Recheck echo every 3-5 years, sooner if  becomes symptomatic.            Genitourinary and Reproductive     Chronic kidney disease, stage III (moderate) (Chronic)    Current Assessment & Plan     Slight worsening per 10/22 labs. Followed by Dr. Reid. Continue to avoid ACEi or ARB due to potential interaction with lithium. If absolutely necessary lithium dose could be monitored and decreased. Reasonable to try Farxiga to decrease progression of CKD if ok with Dr. Reid - advised pt could possibly increase lithium levels - he chose to not start due to risk of lithium toxicity. Will check for renal artery stenosis and/or pheochromocytoma - unable to obtain any records that this has been done in the past.         Relevant Orders    Catecholamine+VMA, 24-Hr Urine - Urine, Clean Catch    Metanephrines, Urine, 24 Hour - Urine, Clean Catch    Duplex Renal Artery - Bilateral Complete CAR       Hematology and Neoplasia    Anemia    Current Assessment & Plan     Worsened per 10/22 labs. Unclear if has been worked up. Hasn't been adherent with iron supplement. He denies BRRB or melena. Encouraged him to resume iron supplementation and take regularly. He is aware may be related to CKD. Nephrology is following. He anticipates having repeat labs soon.          Patient was given instructions and counseling regarding his condition or for health maintenance advice. Please see specific information pulled into the AVS if appropriate.    Follow Up :   Return in about 6 months (around 8/10/2023) for Recheck.             ANUSHKA Trevizo, ACNP-BC, CHFN-BC

## 2023-02-10 NOTE — TELEPHONE ENCOUNTER
Caller: Hilario Tomas     Relationship: SELF    Best call back number: [phone]    What is your medical concern? PT WAS CALLING TO LET GLENDY BLUM KNOW THAT HE WAS ABLE TO GET HIS BLOOD PRESSURE DOWN AFTER A WHILE SINCE SEEING HER IN THE OFFICE TODAY. IT IS NOW /75.     How long has this issue been going on? TODAY    Is your provider already aware of this issue? YES    Have you been treated for this issue? YES

## 2023-02-10 NOTE — TELEPHONE ENCOUNTER
Much better. Have him continue to check regularly at home - daily to at least 3 times per week. See my other note to have him try stopping clonidine and only using as needed for sustained SBP greater than 160 and instructions for 24 hour urine testing. TX!

## 2023-02-10 NOTE — ASSESSMENT & PLAN NOTE
Much better controlled with current therapy. Continue to wean clonidine if possible - take only if sustained SBP greater than 160. Appreciate nephrology and Dr. Dial's assistance. Encouraged healthy diet and gradual increase in activity. Unable to verify if checked for renal artery stenosis or pheochromocytoma. Will check renal artery US (would prefer Renal artery CTA but will avoid contrast given worsened renal function) and check 24 Hr urine for catecholamines, metanephrines, and VMA. Notified patient of foods and medications to avoid for 72 hours before and during 24 hour urine collection. Encouraged routine home BP monitoring.

## 2023-02-13 DIAGNOSIS — N18.32 STAGE 3B CHRONIC KIDNEY DISEASE: Chronic | ICD-10-CM

## 2023-02-13 DIAGNOSIS — I70.1 RENAL ARTERY STENOSIS: ICD-10-CM

## 2023-02-13 DIAGNOSIS — I10 UNCONTROLLED HYPERTENSION: Primary | Chronic | ICD-10-CM

## 2023-02-20 ENCOUNTER — HOSPITAL ENCOUNTER (OUTPATIENT)
Dept: ULTRASOUND IMAGING | Facility: HOSPITAL | Age: 53
Discharge: HOME | End: 2023-02-20
Admitting: NURSE PRACTITIONER
Payer: COMMERCIAL

## 2023-02-20 DIAGNOSIS — I70.1 RENAL ARTERY STENOSIS: ICD-10-CM

## 2023-02-20 DIAGNOSIS — N18.32 STAGE 3B CHRONIC KIDNEY DISEASE: Chronic | ICD-10-CM

## 2023-02-20 DIAGNOSIS — I10 UNCONTROLLED HYPERTENSION: Chronic | ICD-10-CM

## 2023-02-20 PROCEDURE — 76775 US EXAM ABDO BACK WALL LIM: CPT

## 2023-02-20 PROCEDURE — 93975 VASCULAR STUDY: CPT

## 2023-02-21 NOTE — PROGRESS NOTES
Please notify patient his kidney ultrasound did not show kidney artery stenosis which is good. He did have small cortical cysts of both kidneys - probably benign - please send a copy of the result to Dr. Dial and Taylor Pienda NP. Will await urine test results. TX!

## 2023-03-06 DIAGNOSIS — N18.32 STAGE 3B CHRONIC KIDNEY DISEASE: Chronic | ICD-10-CM

## 2023-03-06 DIAGNOSIS — I10 UNCONTROLLED HYPERTENSION: Chronic | ICD-10-CM

## 2023-08-11 ENCOUNTER — OFFICE VISIT (OUTPATIENT)
Dept: CARDIOLOGY | Facility: CLINIC | Age: 53
End: 2023-08-11
Payer: COMMERCIAL

## 2023-08-11 VITALS
HEIGHT: 74 IN | OXYGEN SATURATION: 98 % | SYSTOLIC BLOOD PRESSURE: 172 MMHG | WEIGHT: 210 LBS | DIASTOLIC BLOOD PRESSURE: 80 MMHG | HEART RATE: 56 BPM | BODY MASS INDEX: 26.95 KG/M2

## 2023-08-11 DIAGNOSIS — I10 UNCONTROLLED HYPERTENSION: Primary | Chronic | ICD-10-CM

## 2023-08-11 DIAGNOSIS — I35.1 AORTIC VALVE INSUFFICIENCY, ETIOLOGY OF CARDIAC VALVE DISEASE UNSPECIFIED: ICD-10-CM

## 2023-08-11 DIAGNOSIS — E78.00 ELEVATED CHOLESTEROL: Chronic | ICD-10-CM

## 2023-08-11 DIAGNOSIS — N18.32 STAGE 3B CHRONIC KIDNEY DISEASE: Chronic | ICD-10-CM

## 2023-08-11 NOTE — PROGRESS NOTES
Encounter Date:08/11/2023  Chief Complaint:   Subjective    Hilario Tomas is a 53 y.o. male who presents to [facility]ARDIOLOG[facility] today for six month cardiac follow-up.      History of Present Illness   HPI       Hypertension     Additional comments: Pcp doubled imdur dose and changed to bedtime - been doing better - less headaches. Running 120-140s/70s-80s at home - fluctuates 20 points - sometimes checks up to 5 times about a minute apart. Usually only checks about once a week in the afternoons/evenings. Off clonidine - hasn't even needed PRN. Seeing Taylor Pineda NP for CKD. Having some swelling.             Hyperlipidemia     Additional comments: Well controlled 6/23 labs except HDL low at 29. Struggling to eat healthy and exercise regularly. Walks a lot - tries to walk on his break.              Cardiac Valve Problem     Additional comments: Mild aortic regurgitation per 9/22 echo.              Follow-up     Additional comments: Six month          Last edited by Amanda Edwards APRN on 8/11/2023  2:33 PM.        History: Past medical, surgical, family, and social history reviewed.    Outpatient Medications Marked as Taking for the 8/11/23 encounter (Office Visit) with Amanda Edwards APRN   Medication Sig Dispense Refill    allopurinol (ZYLOPRIM) 300 MG tablet Take 1 tablet by mouth Daily.      amLODIPine (NORVASC) 10 MG tablet Take 1 tablet by mouth Daily.  0    carBAMazepine (TEGretol) 200 MG tablet Take 1-2 tablets by mouth 2 (Two) Times a Day. 2 tabs am, 1 tab pm      clonazePAM (KlonoPIN) 1 MG tablet Take 1 tablet by mouth 2 (Two) Times a Day.      doxazosin (CARDURA) 8 MG tablet Take 2 tablets by mouth Every Night. 60 tablet 11    ferrous sulfate 325 (65 FE) MG tablet Take 1 tablet by mouth Daily With Breakfast. Hasn't been taking regularly      gemfibrozil (LOPID) 600 MG tablet Take 1 tablet by mouth Daily With Dinner.      hydrALAZINE (APRESOLINE) 100 MG tablet Take 2 tablets  "by mouth 2 (Two) Times a Day. (Patient taking differently: Take 2 tablets by mouth 2 (Two) Times a Day. 2 tablets BID) 360 tablet 3    isosorbide mononitrate (IMDUR) 30 MG 24 hr tablet Take 2 tablets by mouth Daily. *PCP increased to 60 mg- at night, working so much better for him* By PCP for BP      lithium 300 MG tablet Take 1 tablet by mouth Every 12 (Twelve) Hours.      vitamin D3 125 MCG (5000 UT) capsule capsule Take 1 capsule by mouth Daily. Unsure of dose          Objective     Vital Signs:   /80 (BP Location: Left arm, Patient Position: Sitting, Cuff Size: Adult)   Pulse 56   Ht 188 cm (74.02\")   Wt 95.3 kg (210 lb)   SpO2 98%   BMI 26.95 kg/mý   Wt Readings from Last 3 Encounters:   08/11/23 95.3 kg (210 lb)   02/10/23 95.6 kg (210 lb 12.8 oz)   08/12/22 94.6 kg (208 lb 9.6 oz)         Vitals reviewed.   Constitutional:       Appearance: Well-developed.   Eyes:      General: No scleral icterus.  Neck:      Vascular: No JVD.   Pulmonary:      Effort: Pulmonary effort is normal.   Cardiovascular:      Normal rate. Regular rhythm.      Murmurs: There is a harsh midmurmur at the URSB.      No gallop.    Pulses:     Intact distal pulses.   Edema:     Pretibial: bilateral 1+ pitting edema of the pretibial area.     Ankle: bilateral 1+ pitting edema of the ankle.  Skin:     General: Skin is warm and dry.      Comments: Hemosiderin staining distal bilateral lower extremities   Neurological:      Mental Status: Alert and oriented to person, place, and time.   Psychiatric:         Behavior: Behavior is cooperative.         Cognition and Memory: Cognition and memory normal.         Judgment: Judgment normal.       Result Review  Data Reviewed:  The following data was reviewed by: ANUSHKA Rollins on 08/11/2023  Scan on 7/27/2023 by Amanda Edwards APRN: CMP/URIC/MG/ANEMIA/VIT D/ PTH/PHOS/UA/ [facility]ILLAGE MD/ 7-    Scan on 6/22/2023 by Amanda Edwards APRN: " BMP/LIPID/A1C/LITHIUM/CARBAMAZEPINE/ [facility]/ 6-    Scan on 5/16/2023 by Amanda Edwards APRN: CBC/CMP/URIC/MG/PHOS/ANEMIA/UA/ [facility]/ 5-               ECG 12 Lead    Date/Time: 8/11/2023 2:44 PM  Performed by: Amanda Edwards APRN  Authorized by: Amanda Edwards APRN   Comparison: compared with previous ECG from 8/12/2022  Similar to previous ECG  Rhythm: sinus rhythm  Rate: normal  BPM: 63  Conduction: non-specific intraventricular conduction delay  QRS axis: left    Clinical impression: abnormal EKG           Assessment and Plan   Problem List Items Addressed This Visit          Cardiac and Vasculature    Uncontrolled hypertension - Primary (Chronic)    Overview     Has tried clonidine patch due to dry mouth with pills. Has difficulty tolerating different medications or has drug to drug interactions with lithium. Didn't tolerate prazosin.         Current Assessment & Plan     Remains much better controlled with current therapy per home readings. Continue clonidine only if sustained SBP greater than 160. Appreciate nephrology and Dr. Dial's assistance. Encouraged healthy diet and gradual increase in activity. No evidence of renal artery stenosis or pheochromocytoma per 2/2023 workup. Encouraged continued routine home BP monitoring.         Relevant Orders    ECG 12 Lead    Elevated cholesterol (Chronic)    Current Assessment & Plan     Almost to goal per 6/23 lipid panel except low HDL (29). Encouraged healthy diet, weight loss, and routine aerobic exercise. Previously high triglycerides and low HDL. Continue gemfibrozil. Encouraged healthy diet and gradual increase in routine aerobic activity to help raise HDL.         Aortic regurgitation    Overview     Mild 9/2022 echo, EF 65-70%, mild LAE, normal RV size and function         Current Assessment & Plan     Recheck echo every 3-5 years, sooner if becomes symptomatic.            Genitourinary and Reproductive      Chronic kidney disease, stage III (moderate) (Chronic)    Current Assessment & Plan     Followed by Dr. Reid. Continue to avoid ACEi or ARB due to potential interaction with lithium. If absolutely necessary lithium dose could be monitored and decreased.          Patient was given instructions and counseling regarding his condition or for health maintenance advice. Please see specific information pulled into the AVS if appropriate.    Follow Up :   Return in about 6 months (around 2/11/2024) for Recheck.             Amanda Edwards, APRN, ACNP-BC, CHFN-BC

## 2023-08-11 NOTE — ASSESSMENT & PLAN NOTE
Followed by Dr. Reid. Continue to avoid ACEi or ARB due to potential interaction with lithium. If absolutely necessary lithium dose could be monitored and decreased.

## 2023-08-11 NOTE — ASSESSMENT & PLAN NOTE
Remains much better controlled with current therapy per home readings. Continue clonidine only if sustained SBP greater than 160. Appreciate nephrology and Dr. Dial's assistance. Encouraged healthy diet and gradual increase in activity. No evidence of renal artery stenosis or pheochromocytoma per 2/2023 workup. Encouraged continued routine home BP monitoring.

## 2023-08-11 NOTE — ASSESSMENT & PLAN NOTE
Almost to goal per 6/23 lipid panel except low HDL (29). Encouraged healthy diet, weight loss, and routine aerobic exercise. Previously high triglycerides and low HDL. Continue gemfibrozil. Encouraged healthy diet and gradual increase in routine aerobic activity to help raise HDL.

## 2023-08-21 ENCOUNTER — CLINICAL SUPPORT (OUTPATIENT)
Dept: CARDIOLOGY | Facility: CLINIC | Age: 53
End: 2023-08-21
Payer: COMMERCIAL

## 2023-08-21 ENCOUNTER — TELEPHONE (OUTPATIENT)
Dept: CARDIOLOGY | Facility: CLINIC | Age: 53
End: 2023-08-21
Payer: COMMERCIAL

## 2023-08-21 DIAGNOSIS — R00.1 BRADYCARDIA: Primary | ICD-10-CM

## 2023-08-21 PROCEDURE — 93000 ELECTROCARDIOGRAM COMPLETE: CPT | Performed by: NURSE PRACTITIONER

## 2023-08-21 NOTE — TELEPHONE ENCOUNTER
Notified patients wife to have patient call if any HRs less than 50 or any symptoms of weakness, near sycnope, or syncope. She voiced understanding

## 2023-08-21 NOTE — TELEPHONE ENCOUNTER
"Patient presented to office today for EKG recommended by Taylor Pineda NP.   Patient stated \"I dont feel any worse than I normally do, just a little more tired\".  Advised to continue to steer away from ACEi and ARB per your last office note.  No new labs have been draw by Taylor Pineda NP or Dr Dial. Last lithium was checked 6/2023.  Taylor advised him today that kidney function was \"great\" when they reviewed his last labs.    Advised patient Amanda Edwards NP reviewed EKG done today and no new recommendations were advised.    Call if any worsening.  He voiced understanding.  "

## 2023-08-21 NOTE — PROGRESS NOTES
ECG 12 Lead    Date/Time: 8/21/2023 12:41 PM  Performed by: Amanda Edwards APRN  Authorized by: Amanda Edwards APRN   Comparison: compared with previous ECG from 8/11/2023  Comparison to previous ECG: HR decreased from 63 bpm  Rhythm: sinus bradycardia  Rate: bradycardic  BPM: 53  Conduction comments: Moderate IVCD  QRS axis: left    Clinical impression: abnormal EKG      Staff advised patient to call if any HRs less than 50 or any symptoms of weakness, near sycnope, or syncope.      ANUSHKA Trevizo, ACNP-BC, CHFN-BC

## 2024-02-20 ENCOUNTER — OFFICE VISIT (OUTPATIENT)
Dept: CARDIOLOGY | Facility: CLINIC | Age: 54
End: 2024-02-20
Payer: COMMERCIAL

## 2024-02-20 VITALS
HEIGHT: 74 IN | OXYGEN SATURATION: 99 % | HEART RATE: 67 BPM | SYSTOLIC BLOOD PRESSURE: 170 MMHG | DIASTOLIC BLOOD PRESSURE: 86 MMHG | WEIGHT: 219.4 LBS | BODY MASS INDEX: 28.16 KG/M2

## 2024-02-20 DIAGNOSIS — N18.4 CKD (CHRONIC KIDNEY DISEASE) STAGE 4, GFR 15-29 ML/MIN: ICD-10-CM

## 2024-02-20 DIAGNOSIS — I10 UNCONTROLLED HYPERTENSION: Primary | Chronic | ICD-10-CM

## 2024-02-20 DIAGNOSIS — E78.00 ELEVATED CHOLESTEROL: Chronic | ICD-10-CM

## 2024-02-20 DIAGNOSIS — R00.2 PALPITATIONS: ICD-10-CM

## 2024-02-20 DIAGNOSIS — I35.1 AORTIC VALVE INSUFFICIENCY, ETIOLOGY OF CARDIAC VALVE DISEASE UNSPECIFIED: ICD-10-CM

## 2024-02-20 DIAGNOSIS — D64.9 ANEMIA, UNSPECIFIED TYPE: ICD-10-CM

## 2024-02-20 PROCEDURE — 99214 OFFICE O/P EST MOD 30 MIN: CPT | Performed by: NURSE PRACTITIONER

## 2024-02-20 RX ORDER — SODIUM BICARBONATE 650 MG/1
650 TABLET ORAL 2 TIMES DAILY
COMMUNITY
Start: 2024-01-20

## 2024-02-20 RX ORDER — ISOSORBIDE MONONITRATE 120 MG/1
120 TABLET, EXTENDED RELEASE ORAL DAILY
COMMUNITY

## 2024-02-20 RX ORDER — MULTIVIT WITH MINERALS/LUTEIN
500 TABLET ORAL
COMMUNITY

## 2024-02-20 NOTE — Clinical Note
Copy of most recent labs from [facility] - he had done in Feb - last we have is from 1/4/24. TX!

## 2024-02-20 NOTE — ASSESSMENT & PLAN NOTE
Almost to goal except low HDL (29 6/2023 and 28 1/2024). Again encouraged healthy diet, weight loss, and routine aerobic exercise. Previously high triglycerides and low HDL. Continue gemfibrozil. Again encouraged healthy diet and gradual increase in routine aerobic activity to help raise HDL.

## 2024-02-20 NOTE — PROGRESS NOTES
"Encounter Date:02/20/2024  Chief Complaint:   Subjective    Hilario Tomas is a 53 y.o. male who presents to [facility] [place] today for six month  cardiac follow-up.      Hypertension       HPI       Hypertension     Additional comments: 142/62- the highest at home. As low as 130/62. Usually 130s/60s. Has been having headaches the last few days. A little dizziness - he attributes to kidney issues - function dropped. Nephrology added furosemide but stopped due to elevated lithium level. Sees Dr. Dial next week. Still swelling. Wearing compression. Hasn't needed clonidine.             elevated cholesterol     Additional comments: Controlled except HDL 28 per 1/4/24. Hasn't been exercising. Falls asleep if sits still for very long at home (not at work or driving). Feels really tired - hasn't been checked for sleep apnea. No morning headaches. Feels rested \"some mornings\".             Cardiac Valve Problem     Additional comments: Mild AI per 9/22 echo.             Follow-up     Additional comments: 6 mo fu/ EKG done 8-             Palpitations     Additional comments: Has noticed some lately - more so with walking at work. Not daily. A few times per week. For a few minutes - beats hard, makes him feel more sluggish than usual.          Last edited by Amanda Edwards APRN on 2/20/2024  1:37 PM.        History: Past medical, surgical, family, and social history reviewed.    Outpatient Medications Marked as Taking for the 2/20/24 encounter (Office Visit) with Amanda Edwards APRN   Medication Sig Dispense Refill    allopurinol (ZYLOPRIM) 300 MG tablet Take 1 tablet by mouth Every Night.      amLODIPine (NORVASC) 10 MG tablet Take 1 tablet by mouth Every Night.  0    carBAMazepine (TEGretol) 200 MG tablet Take 1-2 tablets by mouth 2 (Two) Times a Day. 2 tabs am, 1 tab pm      clonazePAM (KlonoPIN) 1 MG tablet Take 1 tablet by mouth 2 (Two) Times a Day.      cyanocobalamin " "(VITAMIN B-12) 500 MCG tablet Take 1 tablet by mouth Every Night.      doxazosin (CARDURA) 8 MG tablet Take 2 tablets by mouth Every Night. 60 tablet 11    ferrous sulfate 325 (65 FE) MG tablet Take 1 tablet by mouth Daily With Dinner. Takes at same time as Vit C - with or just before supper      gemfibrozil (LOPID) 600 MG tablet Take 1 tablet by mouth Daily With Dinner.      hydrALAZINE (APRESOLINE) 100 MG tablet Take 2 tablets by mouth 2 (Two) Times a Day. (Patient taking differently: Take 2 tablets by mouth 2 (Two) Times a Day. 2 tablets BID) 360 tablet 3    isosorbide mononitrate (IMDUR) 120 MG 24 hr tablet Take 1 tablet by mouth Daily.      lithium 300 MG tablet Take 1 tablet by mouth Every 12 (Twelve) Hours.      sodium bicarbonate 650 MG tablet Take 1 tablet by mouth 2 (Two) Times a Day.      vitamin C (ASCORBIC ACID) 250 MG tablet Take 2 tablets by mouth Daily With Dinner.      vitamin D3 125 MCG (5000 UT) capsule capsule Take 1 capsule by mouth Daily.      [DISCONTINUED] isosorbide mononitrate (IMDUR) 30 MG 24 hr tablet Take 4 tablets by mouth Daily. *PCP increased from 60 to 120 mg- at night, working so much better for him* By PCP for BP          Objective     Vital Signs:   /86 (BP Location: Left arm, Patient Position: Sitting, Cuff Size: Adult)   Pulse 67   Ht 188 cm (74.02\")   Wt 99.5 kg (219 lb 6.4 oz)   SpO2 99%   BMI 28.16 kg/m²   Wt Readings from Last 3 Encounters:   02/20/24 99.5 kg (219 lb 6.4 oz)   08/11/23 95.3 kg (210 lb)   02/10/23 95.6 kg (210 lb 12.8 oz)         Vitals reviewed.   Constitutional:       Appearance: Well-developed.   Eyes:      General: No scleral icterus.  Neck:      Vascular: No JVD.   Pulmonary:      Effort: Pulmonary effort is normal.   Cardiovascular:      Normal rate. Regular rhythm.      Murmurs: There is a harsh midmurmur at the URSB.      No gallop.    Pulses:     Intact distal pulses.   Edema:     Pretibial: bilateral 2+ pitting edema of the pretibial " area.     Ankle: bilateral 2+ pitting edema of the ankle.  Skin:     General: Skin is warm and dry.      Comments: Hemosiderin staining distal bilateral lower extremities   Neurological:      Mental Status: Alert and oriented to person, place, and time.   Psychiatric:         Behavior: Behavior is cooperative.         Cognition and Memory: Cognition and memory normal.         Judgment: Judgment normal.         Result Review  Data Reviewed:  The following data was reviewed by: ANUSHKA Rollins on 02/20/2024  Scan on 2/6/2024 1529 by Amanda Edwards APRN: CBC/CMP/PHOS/URIC/MG/LIPID/PROT/UA/ [facility]AGE MD/ 1-2-2024                     Assessment and Plan   Problem List Items Addressed This Visit          Cardiac and Vasculature    Uncontrolled hypertension - Primary (Chronic)    Overview     Has tried clonidine patch due to dry mouth with pills. Has difficulty tolerating different medications or has drug to drug interactions with lithium. Didn't tolerate prazosin.         Current Assessment & Plan     Uncontrolled today and unchanged on recheck. Had been much better controlled in the past. Discussed with nephrology who will call him with recommendations. Again encouraged healthy diet and gradual increase in activity. No evidence of renal artery stenosis or pheochromocytoma per 2/2023 workup. Encouraged continued routine home BP monitoring.         Elevated cholesterol (Chronic)    Current Assessment & Plan     Almost to goal except low HDL (29 6/2023 and 28 1/2024). Again encouraged healthy diet, weight loss, and routine aerobic exercise. Previously high triglycerides and low HDL. Continue gemfibrozil. Again encouraged healthy diet and gradual increase in routine aerobic activity to help raise HDL.         Aortic regurgitation    Overview     Mild 9/2022 echo, EF 65-70%, mild LAE, normal RV size and function         Current Assessment & Plan     Recheck echo every 3-5 years, sooner if becomes symptomatic.          Relevant Medications    isosorbide mononitrate (IMDUR) 120 MG 24 hr tablet    Palpitations    Current Assessment & Plan     Check extended Holter with diary. May need to check GENA oximetry too. Call if any worsening.         Relevant Orders    Holter Monitor - 72 Hour Up To 15 Days       Genitourinary and Reproductive     CKD (chronic kidney disease) stage 4, GFR 15-29 ml/min    Current Assessment & Plan     Worsening. Nephrology following closely.            Hematology and Neoplasia    Anemia    Current Assessment & Plan     Most likely secondary to worsening CKD. Continue iron and Vit C supplementation or as recommended by nephrology.         Relevant Medications    cyanocobalamin (VITAMIN B-12) 500 MCG tablet     Patient was given instructions and counseling regarding his condition or for health maintenance advice. Please see specific information pulled into the AVS if appropriate.    Follow Up :   Return in about 6 months (around 8/20/2024) for Recheck.                  ANUSHKA Trevizo, ACNP-BC, CHFN-BC

## 2024-02-20 NOTE — ASSESSMENT & PLAN NOTE
Most likely secondary to worsening CKD. Continue iron and Vit C supplementation or as recommended by nephrology.

## 2024-02-20 NOTE — ASSESSMENT & PLAN NOTE
Uncontrolled today and unchanged on recheck. Had been much better controlled in the past. Discussed with nephrology who will call him with recommendations. Again encouraged healthy diet and gradual increase in activity. No evidence of renal artery stenosis or pheochromocytoma per 2/2023 workup. Encouraged continued routine home BP monitoring.

## 2024-02-21 ENCOUNTER — TELEPHONE (OUTPATIENT)
Dept: CARDIOLOGY | Facility: CLINIC | Age: 54
End: 2024-02-21
Payer: COMMERCIAL

## 2024-03-27 DIAGNOSIS — I48.0 PAROXYSMAL ATRIAL FIBRILLATION: Primary | ICD-10-CM

## 2024-03-27 DIAGNOSIS — I47.10 PSVT (PAROXYSMAL SUPRAVENTRICULAR TACHYCARDIA): ICD-10-CM

## 2024-03-27 DIAGNOSIS — D64.9 ANEMIA, UNSPECIFIED TYPE: ICD-10-CM

## 2024-03-27 RX ORDER — ASPIRIN 325 MG
325 TABLET, DELAYED RELEASE (ENTERIC COATED) ORAL DAILY
COMMUNITY
Start: 2024-03-27

## 2024-04-02 DIAGNOSIS — G47.30 SLEEP-DISORDERED BREATHING: ICD-10-CM

## 2024-04-02 DIAGNOSIS — G47.34 NOCTURNAL HYPOXIA: Primary | ICD-10-CM

## 2024-04-02 DIAGNOSIS — I48.0 PAROXYSMAL ATRIAL FIBRILLATION: ICD-10-CM

## 2024-04-02 DIAGNOSIS — I47.10 PSVT (PAROXYSMAL SUPRAVENTRICULAR TACHYCARDIA): ICD-10-CM

## 2024-04-02 NOTE — PROGRESS NOTES
Please notify patient his GENA on RA is borderline abnormal with oxygen desat index 4. He dropped to 84% and spent 2 1/2 minutes less than 89%. He only wore the monitor for about 3 1/2 hours. Can probably assume his numbers would double if he wore all night long. I recommend he try wearing oxygen while sleeping and repeat GENA on O2 to see if this resolves his low oxygen and desaturation events. If oxygen resolves, will hold off on sleep study. Let me know if he is agreeable. TX!

## 2024-04-02 NOTE — PROGRESS NOTES
Notified pt's wife. She voiced understanding. Ok to move forward with repeat GENA. She said he wore the pulse ox all night, unsure why it did not record longer. Thx

## 2024-04-05 DIAGNOSIS — G47.34 NOCTURNAL HYPOXIA: ICD-10-CM

## 2024-04-05 DIAGNOSIS — G47.30 SLEEP-DISORDERED BREATHING: ICD-10-CM

## 2024-04-05 NOTE — PROGRESS NOTES
Please notify patient his GENA on 1 LPM is a little better but have him try increasing to 2 LPM. Let me know if [facility] needs a new order. Let me know if feeling better, worse, or unchanged with use of oxygen at night. May need to see sleep specialist. TX!

## 2024-04-15 ENCOUNTER — TELEPHONE (OUTPATIENT)
Dept: CARDIOLOGY | Facility: CLINIC | Age: 54
End: 2024-04-15

## 2024-04-15 NOTE — TELEPHONE ENCOUNTER
Sounds great. Have him continue present therapy. Did nephrology change his BP meds? He should be scheduled to see sleep medicine. TX!

## 2024-04-15 NOTE — TELEPHONE ENCOUNTER
Caller: Hilario Tomas    Relationship: Self    Best call back number: [phone] OR HIS WIFE     What is the best time to reach you: ANYTIME     Who are you requesting to speak with (clinical staff, provider,  specific staff member): BUCK       What was the call regarding: PATIENT HAS BEEN MONITORING BLOOD PRESSURE AND ITS BEEN RUNNING ABOUT 130'S TO 60'S AND HIS PULSE HAS BEEN IN THE 60'S TO 70'S . PATIENT WANTS TO LET BUCK KNOW THAT HE IS ON OXYGEN AND IT HAS HELPED HIM A LOT.     Is it okay if the provider responds through MyChart: NO

## 2024-08-02 ENCOUNTER — PREP FOR SURGERY (OUTPATIENT)
Dept: OTHER | Facility: HOSPITAL | Age: 54
End: 2024-08-02
Payer: COMMERCIAL

## 2024-08-02 ENCOUNTER — OFFICE VISIT (OUTPATIENT)
Dept: CARDIOLOGY | Facility: CLINIC | Age: 54
End: 2024-08-02
Payer: COMMERCIAL

## 2024-08-02 VITALS
OXYGEN SATURATION: 98 % | WEIGHT: 202 LBS | DIASTOLIC BLOOD PRESSURE: 60 MMHG | SYSTOLIC BLOOD PRESSURE: 140 MMHG | HEART RATE: 72 BPM | BODY MASS INDEX: 25.93 KG/M2 | HEIGHT: 74 IN

## 2024-08-02 DIAGNOSIS — I48.0 PAROXYSMAL ATRIAL FIBRILLATION: Primary | ICD-10-CM

## 2024-08-02 RX ORDER — SODIUM CHLORIDE 0.9 % (FLUSH) 0.9 %
10 SYRINGE (ML) INJECTION AS NEEDED
OUTPATIENT
Start: 2024-08-02

## 2024-08-02 RX ORDER — SODIUM CHLORIDE 0.9 % (FLUSH) 0.9 %
10 SYRINGE (ML) INJECTION EVERY 12 HOURS SCHEDULED
OUTPATIENT
Start: 2024-08-02

## 2024-08-02 RX ORDER — SODIUM CHLORIDE 9 MG/ML
40 INJECTION, SOLUTION INTRAVENOUS AS NEEDED
OUTPATIENT
Start: 2024-08-02

## 2024-08-02 NOTE — PROGRESS NOTES
"EP NEW PATIENT VISIT    Chief Complaint  No chief complaint on file.    Subjective        History of Present Illness    EP Problems:  1.  Paroxysmal atrial fibrillation  2.  Tachy-jason syndrome  3.  IVCD    Cardiology Problems:  1.  Hypertension    Medical Problems:  1.  CKD stage IV  2.  Chronic anemia (macrocytic)      Hilario Tomas is a 54 y.o. male with problem list as above who presents to the clinic for evaluation of paroxysmal atrial fibrillation.  He has fatigue in association with his paroxysmal atrial fibrillation.  He states that he will occasionally feel flutters or some chest discomfort with this as well.  He wore a Holter monitor with a 12% overall burden.  There was a single 4 and half second offset pause consistent with tachy-jason syndrome as well.  Given these findings, he was referred to EP for further evaluation.    Objective   Vital Signs:  /60   Pulse 72   Ht 188 cm (74\")   Wt 91.6 kg (202 lb)   SpO2 98%   BMI 25.94 kg/m²   Estimated body mass index is 25.94 kg/m² as calculated from the following:    Height as of this encounter: 188 cm (74\").    Weight as of this encounter: 91.6 kg (202 lb).      Physical Exam  Vitals reviewed.   Constitutional:       Appearance: Normal appearance.   Cardiovascular:      Rate and Rhythm: Normal rate and regular rhythm.      Heart sounds: Normal heart sounds. Murmur heard.   Pulmonary:      Effort: Pulmonary effort is normal.      Breath sounds: Normal breath sounds.   Musculoskeletal:         General: No swelling.   Neurological:      Mental Status: He is alert and oriented to person, place, and time.   Psychiatric:         Mood and Affect: Mood normal.         Judgment: Judgment normal.        Result Review :  The following data was reviewed by: Angie Newton MD on 08/02/2024:    2/20/2024 hemoglobin reviewed: 8.7, macrocytic, normal platelets and WBC    2/20/2024 BMP reviewed: Creatinine 3.3, potassium 4.4    3/4/2024 Holter monitor reviewed: " Predominantly sinus rhythm, 12% atrial fibrillation burden, single offset pause lasting 4.5 seconds in duration      [id number] SCORE   [id number] Score: 1 (8/2/2024 10:15 AM)          ECG 12 Lead    Date/Time: 8/2/2024 10:16 AM  Performed by: Angie Newton MD    Authorized by: Angie Newton MD  Comparison: compared with previous ECG from 8/21/2023  Similar to previous ECG  Rhythm: sinus rhythm  Rate: normal  Conduction: non-specific intraventricular conduction delay  QRS axis: left  Other: no other findings    Clinical impression: abnormal EKG              Assessment and Plan   There are no diagnoses linked to this encounter.    Hilario Tomas is a 54 y.o. male with problem list as above who presents to the clinic for evaluation of paroxysmal atrial fibrillation.  He has findings consistent with symptomatic paroxysmal atrial fibrillation.  Given his previous conversion pauses, IVCD, and renal disease, I do think that ablation is a better option for him than antiarrhythmic medications.    I have discussed risks, benefits, and alternatives of an electrophysiology study and ablation for atrial fibrillation with the patient.  Alternatives discussed include continued observation and medical management.  An electrophysiology study with ablation is an inherently high risk procedure with possible complications that include but are not limited to vascular access complications, internal bleeding, tamponade requiring pericardiocentesis or cardiac surgery, stroke, MI, embolism, myocardial injury, injury to the normal conduction system requiring a pacemaker, pulmonary vein stenosis, atrio-esophageal fistula, and ultimately death.  We also discussed that given the nature of the procedure, therapeutic efficacy can be variable.  Possibilities of recurrent arrhythmias and the possible need for additional procedures and/or medical therapy was discussed. Questions asked were appropriately answered.  No guarantees were made or  implied.   Despite this, they would still like to proceed.    Plan:  -Schedule for atrial fibrillation ablation  -Start anticoagulation 3 weeks prior to procedure (stop aspirin when anticoagulation is started)  -No additional medication changes for now           Follow Up   Return in about 3 months (around 11/2/2024).  Patient was given instructions and counseling regarding his condition or for health maintenance advice. Please see specific information pulled into the AVS if appropriate.     Part of this note may be an electronic transcription/translation of spoken language to printed text using the Dragon Dictation System.

## 2024-08-02 NOTE — PATIENT INSTRUCTIONS
3 month follow up  Schedule for ablation  Plan to start blood thinners 3 weeks prior to the procedure

## 2024-08-20 ENCOUNTER — OFFICE VISIT (OUTPATIENT)
Dept: CARDIOLOGY | Facility: CLINIC | Age: 54
End: 2024-08-20
Payer: COMMERCIAL

## 2024-08-20 VITALS
SYSTOLIC BLOOD PRESSURE: 158 MMHG | HEART RATE: 72 BPM | OXYGEN SATURATION: 99 % | HEIGHT: 74 IN | WEIGHT: 209.8 LBS | DIASTOLIC BLOOD PRESSURE: 74 MMHG | BODY MASS INDEX: 26.92 KG/M2

## 2024-08-20 DIAGNOSIS — N18.4 CKD (CHRONIC KIDNEY DISEASE) STAGE 4, GFR 15-29 ML/MIN: Chronic | ICD-10-CM

## 2024-08-20 DIAGNOSIS — I48.0 PAROXYSMAL ATRIAL FIBRILLATION: ICD-10-CM

## 2024-08-20 DIAGNOSIS — N18.4 ANEMIA DUE TO STAGE 4 CHRONIC KIDNEY DISEASE: ICD-10-CM

## 2024-08-20 DIAGNOSIS — D63.1 ANEMIA DUE TO STAGE 4 CHRONIC KIDNEY DISEASE: ICD-10-CM

## 2024-08-20 DIAGNOSIS — E78.00 ELEVATED CHOLESTEROL: Chronic | ICD-10-CM

## 2024-08-20 DIAGNOSIS — I10 UNCONTROLLED HYPERTENSION: Primary | Chronic | ICD-10-CM

## 2024-08-20 DIAGNOSIS — I35.1 AORTIC VALVE INSUFFICIENCY, ETIOLOGY OF CARDIAC VALVE DISEASE UNSPECIFIED: ICD-10-CM

## 2024-08-20 PROCEDURE — 99214 OFFICE O/P EST MOD 30 MIN: CPT | Performed by: NURSE PRACTITIONER

## 2024-08-20 RX ORDER — MINOXIDIL 2.5 MG/1
5 TABLET ORAL DAILY
COMMUNITY

## 2024-08-20 RX ORDER — FUROSEMIDE 20 MG/1
20 TABLET ORAL 2 TIMES DAILY
COMMUNITY

## 2024-08-20 NOTE — ASSESSMENT & PLAN NOTE
New onset per 3/2024 extended Holter. On full strength aspirin until changes to Eliquis late October for ablation 11/21/24. Appreciate Dr. Newton's assistance and agree with plans. Heart rate fairly well controlled. Likely contributing to fatigue. Call if any worsening.

## 2024-08-20 NOTE — PROGRESS NOTES
Encounter Date:08/20/2024  Chief Complaint:   Subjective    Hilario Tomas is a 54 y.o. male who presents to [facility] today for six month cardiac follow-up. He has started feeling better/more optimistic - got depressed over kidney function being worse/not getting better. Saw Dr. Newton two weeks ago for atrial fibrillation and is scheduled for ablation in November.       Hypertension    Hyperlipidemia    Atrial Fibrillation  Past medical history includes atrial fibrillation and hyperlipidemia.   Follow-up  Conditions present:  Hyperlipidemia       HPI       Hypertension     Additional comments: Has been running 140-150s/70s. A little better since addition of minoxidil by PCP despite increase in medication. Increased swelling.              Hyperlipidemia     Additional comments: Almost to goal per 2/2024. Not walking consistently - tries to get extra steps through the day. Tries to eat healthy.             Cardiac Valve Problem     Additional comments: Mild AI per 9/2022. Having worsened swelling since starting minoxidil for BP. Taking furosemide daily now.             Atrial Fibrillation     Additional comments: New onset/12% burden per 3/2024 extended Holter. Still having some palpitations - feels tired all the time - also has CKD. Scheduled for ablation with Dr Newton 11-21-24. No falls or bleeding. Will take Eliquis before and after ablation. On full strength aspirin until starts Eliquis in October. Chronic anemia from CKD - seeing hematology and nephrology.             Follow-up     Additional comments: Six month. All labs from [facility] in chart.          Last edited by Amanda Edwards APRN on 8/20/2024  5:41 PM.        History: Past medical, surgical, family, and social history reviewed.    Outpatient Medications Marked as Taking for the 8/20/24 encounter (Office Visit) with Amanda Edwards APRN   Medication Sig Dispense Refill    allopurinol (ZYLOPRIM) 300 MG tablet  "Take 1 tablet by mouth Every Night.      amLODIPine (NORVASC) 10 MG tablet Take 1 tablet by mouth Every Night.  0    aspirin 325 MG EC tablet Take 1 tablet by mouth Daily.      carBAMazepine (TEGretol) 200 MG tablet Take 1-2 tablets by mouth 2 (Two) Times a Day. 2 tabs am, 1 tab pm      clonazePAM (KlonoPIN) 1 MG tablet Take 1 tablet by mouth 2 (Two) Times a Day.      cyanocobalamin (VITAMIN B-12) 500 MCG tablet Take 1 tablet by mouth Every Night.      doxazosin (CARDURA) 8 MG tablet Take 2 tablets by mouth Every Night. 60 tablet 11    ferrous sulfate 325 (65 FE) MG tablet Take 1 tablet by mouth Daily With Dinner. Takes at same time as Vit C - with or just before supper      furosemide (LASIX) 20 MG tablet Take 1 tablet by mouth 2 (Two) Times a Day.      gemfibrozil (LOPID) 600 MG tablet Take 1 tablet by mouth Daily With Dinner.      hydrALAZINE (APRESOLINE) 100 MG tablet Take 2 tablets by mouth 2 (Two) Times a Day. (Patient taking differently: Take 2 tablets by mouth 2 (Two) Times a Day. 2 tablets BID) 360 tablet 3    isosorbide mononitrate (IMDUR) 120 MG 24 hr tablet Take 1 tablet by mouth Daily.      lithium 300 MG tablet Take 1 tablet by mouth Every 12 (Twelve) Hours.      minoxidil (LONITEN) 2.5 MG tablet Take 2 tablets by mouth Daily. Started by pcp/ taking in the AM      NON FORMULARY Generic for Procrit - gets injection from hematology for production of red blood cells (Hgb)      sodium bicarbonate 650 MG tablet Take 1 tablet by mouth 2 (Two) Times a Day.      vitamin C (ASCORBIC ACID) 250 MG tablet Take 2 tablets by mouth Daily With Dinner.      vitamin D3 125 MCG (5000 UT) capsule capsule Take 1 capsule by mouth Daily.          Objective     Vital Signs:   /74 (BP Location: Left arm, Patient Position: Sitting, Cuff Size: Adult)   Pulse 72   Ht 188 cm (74.02\")   Wt 95.2 kg (209 lb 12.8 oz)   SpO2 99%   BMI 26.93 kg/m²   Wt Readings from Last 3 Encounters:   08/20/24 95.2 kg (209 lb 12.8 oz) "   08/02/24 91.6 kg (202 lb)   02/20/24 99.5 kg (219 lb 6.4 oz)         Vitals reviewed.   Constitutional:       Appearance: Well-developed.   Eyes:      General: No scleral icterus.  Neck:      Vascular: No JVD.   Pulmonary:      Effort: Pulmonary effort is normal.   Cardiovascular:      Normal rate. Regular rhythm.      Murmurs: There is a harsh midmurmur at the URSB.      No gallop.    Pulses:     Intact distal pulses.   Edema:     Pretibial: bilateral 2+ pitting edema of the pretibial area.     Ankle: bilateral 2+ pitting edema of the ankle.  Skin:     General: Skin is warm and dry.      Comments: Hemosiderin staining distal bilateral lower extremities   Neurological:      Mental Status: Alert and oriented to person, place, and time.   Psychiatric:         Behavior: Behavior is cooperative.         Cognition and Memory: Cognition and memory normal.         Judgment: Judgment normal.         Result Review  Data Reviewed:  The following data was reviewed by: ANUSHKA Rollins on 08/20/2024  LABS SCANNED (08/16/2024 00:00)  LABS SCANNED (07/26/2024 00:00)  LABS SCANNED (07/08/2024 00:00)  LABS SCANNED (07/05/2024 00:00)  LABS SCANNED (06/14/2024 00:00)  LABS SCANNED (05/24/2024 00:00)  LABS SCANNED (05/23/2024 00:00)  LABS SCANNED (04/01/2024 00:00)  LABS SCANNED (03/04/2024 00:00)  Scan on 2/23/2024 1307 by Amanda Edwards APRN: CBC/BMP/HEP/LIPID/A1C/ [facility] MD/ 2-  Holter Monitor - 72 Hour Up To 15 Days (03/04/2024 09:15)     CARDIOLOGY VISIT - SCAN - EKG DR JARQUIN 8/2/24 (08/12/2024)                  Assessment and Plan   Problem List Items Addressed This Visit          Cardiac and Vasculature    Uncontrolled hypertension - Primary (Chronic)    Overview     Has tried clonidine patch due to dry mouth with pills. Has difficulty tolerating different medications or has drug to drug interactions with lithium. Didn't tolerate prazosin.         Current Assessment & Plan     Remains uncontrolled today  but better than previous visit. Had been much better controlled in the past. Previously discussed with nephrology - limited options given lithium and intolerances. Again encouraged healthy diet and gradual increase in activity. No evidence of renal artery stenosis or pheochromocytoma per 2/2023 workup. Encouraged continued routine home BP monitoring. Reviewed risks of uncontrolled BP.         Relevant Medications    minoxidil (LONITEN) 2.5 MG tablet    furosemide (LASIX) 20 MG tablet    Other Relevant Orders    Basic Metabolic Panel    Paroxysmal atrial fibrillation    Overview     [id number] SCORE   [id number] Score: 1 (8/20/2024  5:49 PM)    HAS-BLED SCORE   Total Risk Score (>5 Very High, 3-5 High, 2 Moderate, 1 Low): 4 (8/20/2024  5:49 PM)             Current Assessment & Plan     New onset per 3/2024 extended Holter. On full strength aspirin until changes to Eliquis late October for ablation 11/21/24. Appreciate Dr. Newton's assistance and agree with plans. Heart rate fairly well controlled. Likely contributing to fatigue. Call if any worsening.         Aortic regurgitation (Chronic)    Overview     Mild 9/2022 echo, EF 65-70%, mild LAE, normal RV size and function         Current Assessment & Plan     Recheck echo every 3-5 years, sooner if becomes symptomatic.         Elevated cholesterol (Chronic)    Current Assessment & Plan     Better controlled per 2/2024 labs - HDL improved to 36 (not quite to goal of greater than 40) and TGs in normal range. Again encouraged healthy diet, weight loss, and routine aerobic exercise. Continue gemfibrozil. Again encouraged healthy diet and gradual increase in routine aerobic activity to help raise HDL.            Genitourinary and Reproductive     CKD (chronic kidney disease) stage 4, GFR 15-29 ml/min (Chronic)    Current Assessment & Plan     Worsening. Nephrology following closely. Discussed difficulty controlling edema in Stage 4. Continue compression. Check BMP and  advised him to notify nephrology that he has been taking loop diuretic daily.         Relevant Medications    furosemide (LASIX) 20 MG tablet    Other Relevant Orders    Basic Metabolic Panel       Hematology and Neoplasia    Anemia    Current Assessment & Plan     Most likely secondary to worsening CKD. Continue iron and Vit C supplementation and erythropoietin stimulator as recommended by nephrology and hematology.          Patient was given instructions and counseling regarding his condition or for health maintenance advice. Please see specific information pulled into the AVS if appropriate.    Follow Up :   Return in about 6 months (around 2/20/2025) for Recheck.                  Amanda Edwards, APRN, ACNP-BC, CHFN-BC

## 2024-08-20 NOTE — ASSESSMENT & PLAN NOTE
Worsening. Nephrology following closely. Discussed difficulty controlling edema in Stage 4. Continue compression. Check BMP and advised him to notify nephrology that he has been taking loop diuretic daily.

## 2024-08-20 NOTE — ASSESSMENT & PLAN NOTE
Remains uncontrolled today but better than previous visit. Had been much better controlled in the past. Previously discussed with nephrology - limited options given lithium and intolerances. Again encouraged healthy diet and gradual increase in activity. No evidence of renal artery stenosis or pheochromocytoma per 2/2023 workup. Encouraged continued routine home BP monitoring. Reviewed risks of uncontrolled BP.

## 2024-08-20 NOTE — ASSESSMENT & PLAN NOTE
Most likely secondary to worsening CKD. Continue iron and Vit C supplementation and erythropoietin stimulator as recommended by nephrology and hematology.

## 2024-08-20 NOTE — ASSESSMENT & PLAN NOTE
Better controlled per 2/2024 labs - HDL improved to 36 (not quite to goal of greater than 40) and TGs in normal range. Again encouraged healthy diet, weight loss, and routine aerobic exercise. Continue gemfibrozil. Again encouraged healthy diet and gradual increase in routine aerobic activity to help raise HDL.

## 2024-08-21 ENCOUNTER — TELEPHONE (OUTPATIENT)
Dept: CARDIOLOGY | Facility: CLINIC | Age: 54
End: 2024-08-21
Payer: COMMERCIAL

## 2024-08-21 NOTE — TELEPHONE ENCOUNTER
Edgar with [facility] called in regards to pt's blood work that we ordered.  He has critical results:    BUN 43  Creatinine 3.6    Edgar is faxing the results to us and is also placing a call over to [facility].    Please advise.

## 2024-08-21 NOTE — TELEPHONE ENCOUNTER
That is only a little worse than he has been and what I was expecting since he increased his furosemide to daily. Will defer to nephrology. TX!

## 2024-10-17 ENCOUNTER — TELEPHONE (OUTPATIENT)
Dept: CARDIOLOGY | Facility: CLINIC | Age: 54
End: 2024-10-17
Payer: COMMERCIAL

## 2024-10-17 NOTE — TELEPHONE ENCOUNTER
Spoke with Mr. Tomas and instructed to begin taking Eliquis 5 mg twice daily on 10/31/24 and stop aspirin at this time. He states he has already filled the medication and plan to start at that date.

## 2024-11-21 ENCOUNTER — ANESTHESIA EVENT (OUTPATIENT)
Dept: CARDIOLOGY | Facility: HOSPITAL | Age: 54
End: 2024-11-21
Payer: COMMERCIAL

## 2024-11-21 ENCOUNTER — HOSPITAL ENCOUNTER (OUTPATIENT)
Facility: HOSPITAL | Age: 54
Setting detail: HOSPITAL OUTPATIENT SURGERY
Discharge: HOME | End: 2024-11-21
Attending: STUDENT IN AN ORGANIZED HEALTH CARE EDUCATION/TRAINING PROGRAM | Admitting: STUDENT IN AN ORGANIZED HEALTH CARE EDUCATION/TRAINING PROGRAM
Payer: COMMERCIAL

## 2024-11-21 ENCOUNTER — ANESTHESIA (OUTPATIENT)
Dept: CARDIOLOGY | Facility: HOSPITAL | Age: 54
End: 2024-11-21
Payer: COMMERCIAL

## 2024-11-21 VITALS
OXYGEN SATURATION: 96 % | SYSTOLIC BLOOD PRESSURE: 98 MMHG | HEART RATE: 64 BPM | TEMPERATURE: 96.6 F | DIASTOLIC BLOOD PRESSURE: 54 MMHG

## 2024-11-21 VITALS
SYSTOLIC BLOOD PRESSURE: 150 MMHG | HEART RATE: 63 BPM | HEIGHT: 74 IN | BODY MASS INDEX: 25.21 KG/M2 | WEIGHT: 196.4 LBS | DIASTOLIC BLOOD PRESSURE: 82 MMHG | OXYGEN SATURATION: 95 % | RESPIRATION RATE: 14 BRPM

## 2024-11-21 DIAGNOSIS — I48.0 PAROXYSMAL ATRIAL FIBRILLATION: ICD-10-CM

## 2024-11-21 DIAGNOSIS — I10 ESSENTIAL HYPERTENSION: ICD-10-CM

## 2024-11-21 LAB
ACT BLD: 299 SECONDS (ref 82–152)
ACT BLD: 385 SECONDS (ref 82–152)
ANION GAP SERPL CALCULATED.3IONS-SCNC: 14 MMOL/L (ref 5–15)
BASOPHILS # BLD AUTO: 0.06 10*3/MM3 (ref 0–0.2)
BASOPHILS NFR BLD AUTO: 1.6 % (ref 0–1.5)
BUN SERPL-MCNC: 46 MG/DL (ref 6–20)
BUN/CREAT SERPL: 14.6 (ref 7–25)
CALCIUM SPEC-SCNC: 9.3 MG/DL (ref 8.6–10.5)
CHLORIDE SERPL-SCNC: 110 MMOL/L (ref 98–107)
CO2 SERPL-SCNC: 21 MMOL/L (ref 22–29)
CREAT SERPL-MCNC: 3.16 MG/DL (ref 0.76–1.27)
DEPRECATED RDW RBC AUTO: 61.1 FL (ref 37–54)
EGFRCR SERPLBLD CKD-EPI 2021: 22.2 ML/MIN/1.73
EOSINOPHIL # BLD AUTO: 0.22 10*3/MM3 (ref 0–0.4)
EOSINOPHIL NFR BLD AUTO: 5.7 % (ref 0.3–6.2)
ERYTHROCYTE [DISTWIDTH] IN BLOOD BY AUTOMATED COUNT: 17.2 % (ref 12.3–15.4)
GLUCOSE SERPL-MCNC: 119 MG/DL (ref 65–99)
HCT VFR BLD AUTO: 31.8 % (ref 37.5–51)
HGB BLD-MCNC: 9.9 G/DL (ref 13–17.7)
IMM GRANULOCYTES # BLD AUTO: 0.01 10*3/MM3 (ref 0–0.05)
IMM GRANULOCYTES NFR BLD AUTO: 0.3 % (ref 0–0.5)
INR PPP: 1.14 (ref 0.91–1.09)
LYMPHOCYTES # BLD AUTO: 0.82 10*3/MM3 (ref 0.7–3.1)
LYMPHOCYTES NFR BLD AUTO: 21.4 % (ref 19.6–45.3)
MCH RBC QN AUTO: 30.3 PG (ref 26.6–33)
MCHC RBC AUTO-ENTMCNC: 31.1 G/DL (ref 31.5–35.7)
MCV RBC AUTO: 97.2 FL (ref 79–97)
MONOCYTES # BLD AUTO: 0.3 10*3/MM3 (ref 0.1–0.9)
MONOCYTES NFR BLD AUTO: 7.8 % (ref 5–12)
NEUTROPHILS NFR BLD AUTO: 2.42 10*3/MM3 (ref 1.7–7)
NEUTROPHILS NFR BLD AUTO: 63.2 % (ref 42.7–76)
NRBC BLD AUTO-RTO: 0 /100 WBC (ref 0–0.2)
PLATELET # BLD AUTO: 159 10*3/MM3 (ref 140–450)
PMV BLD AUTO: 10.9 FL (ref 6–12)
POTASSIUM SERPL-SCNC: 4.1 MMOL/L (ref 3.5–5.2)
PROTHROMBIN TIME: 15.1 SECONDS (ref 11.8–14.8)
RBC # BLD AUTO: 3.27 10*6/MM3 (ref 4.14–5.8)
SODIUM SERPL-SCNC: 145 MMOL/L (ref 136–145)
WBC NRBC COR # BLD AUTO: 3.83 10*3/MM3 (ref 3.4–10.8)

## 2024-11-21 PROCEDURE — C1894 INTRO/SHEATH, NON-LASER: HCPCS | Performed by: STUDENT IN AN ORGANIZED HEALTH CARE EDUCATION/TRAINING PROGRAM

## 2024-11-21 PROCEDURE — C1759 CATH, INTRA ECHOCARDIOGRAPHY: HCPCS | Performed by: STUDENT IN AN ORGANIZED HEALTH CARE EDUCATION/TRAINING PROGRAM

## 2024-11-21 PROCEDURE — 93656 COMPRE EP EVAL ABLTJ ATR FIB: CPT | Performed by: STUDENT IN AN ORGANIZED HEALTH CARE EDUCATION/TRAINING PROGRAM

## 2024-11-21 PROCEDURE — 25010000002 SUGAMMADEX 200 MG/2ML SOLUTION: Performed by: NURSE ANESTHETIST, CERTIFIED REGISTERED

## 2024-11-21 PROCEDURE — C1733 CATH, EP, OTHR THAN COOL-TIP: HCPCS | Performed by: STUDENT IN AN ORGANIZED HEALTH CARE EDUCATION/TRAINING PROGRAM

## 2024-11-21 PROCEDURE — 85610 PROTHROMBIN TIME: CPT | Performed by: STUDENT IN AN ORGANIZED HEALTH CARE EDUCATION/TRAINING PROGRAM

## 2024-11-21 PROCEDURE — 93622 COMP EP EVAL L VENTR PAC&REC: CPT | Performed by: STUDENT IN AN ORGANIZED HEALTH CARE EDUCATION/TRAINING PROGRAM

## 2024-11-21 PROCEDURE — 25010000002 LIDOCAINE 2% SOLUTION: Performed by: STUDENT IN AN ORGANIZED HEALTH CARE EDUCATION/TRAINING PROGRAM

## 2024-11-21 PROCEDURE — 93005 ELECTROCARDIOGRAM TRACING: CPT | Performed by: STUDENT IN AN ORGANIZED HEALTH CARE EDUCATION/TRAINING PROGRAM

## 2024-11-21 PROCEDURE — C1760 CLOSURE DEV, VASC: HCPCS | Performed by: STUDENT IN AN ORGANIZED HEALTH CARE EDUCATION/TRAINING PROGRAM

## 2024-11-21 PROCEDURE — 25010000002 PROPOFOL 10 MG/ML EMULSION: Performed by: NURSE ANESTHETIST, CERTIFIED REGISTERED

## 2024-11-21 PROCEDURE — 93010 ELECTROCARDIOGRAM REPORT: CPT | Performed by: EMERGENCY MEDICINE

## 2024-11-21 PROCEDURE — 25010000002 HEPARIN (PORCINE) PER 1000 UNITS: Performed by: NURSE ANESTHETIST, CERTIFIED REGISTERED

## 2024-11-21 PROCEDURE — C1766 INTRO/SHEATH,STRBLE,NON-PEEL: HCPCS | Performed by: STUDENT IN AN ORGANIZED HEALTH CARE EDUCATION/TRAINING PROGRAM

## 2024-11-21 PROCEDURE — 85347 COAGULATION TIME ACTIVATED: CPT

## 2024-11-21 PROCEDURE — 80048 BASIC METABOLIC PNL TOTAL CA: CPT | Performed by: STUDENT IN AN ORGANIZED HEALTH CARE EDUCATION/TRAINING PROGRAM

## 2024-11-21 PROCEDURE — 25810000003 SODIUM CHLORIDE 0.9 % SOLUTION: Performed by: NURSE ANESTHETIST, CERTIFIED REGISTERED

## 2024-11-21 PROCEDURE — 85025 COMPLETE CBC W/AUTO DIFF WBC: CPT | Performed by: STUDENT IN AN ORGANIZED HEALTH CARE EDUCATION/TRAINING PROGRAM

## 2024-11-21 PROCEDURE — S0260 H&P FOR SURGERY: HCPCS | Performed by: STUDENT IN AN ORGANIZED HEALTH CARE EDUCATION/TRAINING PROGRAM

## 2024-11-21 PROCEDURE — C1732 CATH, EP, DIAG/ABL, 3D/VECT: HCPCS | Performed by: STUDENT IN AN ORGANIZED HEALTH CARE EDUCATION/TRAINING PROGRAM

## 2024-11-21 PROCEDURE — 25010000002 PROTAMINE SULFATE PER 10 MG: Performed by: NURSE ANESTHETIST, CERTIFIED REGISTERED

## 2024-11-21 PROCEDURE — C1730 CATH, EP, 19 OR FEW ELECT: HCPCS | Performed by: STUDENT IN AN ORGANIZED HEALTH CARE EDUCATION/TRAINING PROGRAM

## 2024-11-21 PROCEDURE — 25010000002 ONDANSETRON PER 1 MG: Performed by: NURSE ANESTHETIST, CERTIFIED REGISTERED

## 2024-11-21 RX ORDER — IBUPROFEN 600 MG/1
600 TABLET, FILM COATED ORAL EVERY 6 HOURS PRN
Status: CANCELLED | OUTPATIENT
Start: 2024-11-21

## 2024-11-21 RX ORDER — ONDANSETRON 2 MG/ML
4 INJECTION INTRAMUSCULAR; INTRAVENOUS
Status: CANCELLED | OUTPATIENT
Start: 2024-11-21

## 2024-11-21 RX ORDER — SODIUM CHLORIDE 0.9 % (FLUSH) 0.9 %
10 SYRINGE (ML) INJECTION AS NEEDED
Status: DISCONTINUED | OUTPATIENT
Start: 2024-11-21 | End: 2024-11-21 | Stop reason: HOSPADM

## 2024-11-21 RX ORDER — NALOXONE HCL 0.4 MG/ML
0.04 VIAL (ML) INJECTION AS NEEDED
Status: CANCELLED | OUTPATIENT
Start: 2024-11-21

## 2024-11-21 RX ORDER — LIDOCAINE HYDROCHLORIDE 20 MG/ML
INJECTION, SOLUTION INFILTRATION; PERINEURAL
Status: DISCONTINUED | OUTPATIENT
Start: 2024-11-21 | End: 2024-11-21 | Stop reason: HOSPADM

## 2024-11-21 RX ORDER — ROCURONIUM BROMIDE 10 MG/ML
INJECTION, SOLUTION INTRAVENOUS AS NEEDED
Status: DISCONTINUED | OUTPATIENT
Start: 2024-11-21 | End: 2024-11-21 | Stop reason: SURG

## 2024-11-21 RX ORDER — FLUMAZENIL 0.1 MG/ML
0.2 INJECTION INTRAVENOUS AS NEEDED
Status: CANCELLED | OUTPATIENT
Start: 2024-11-21

## 2024-11-21 RX ORDER — SODIUM CHLORIDE 0.9 % (FLUSH) 0.9 %
10 SYRINGE (ML) INJECTION EVERY 12 HOURS SCHEDULED
Status: DISCONTINUED | OUTPATIENT
Start: 2024-11-21 | End: 2024-11-21 | Stop reason: HOSPADM

## 2024-11-21 RX ORDER — SODIUM CHLORIDE 9 MG/ML
40 INJECTION, SOLUTION INTRAVENOUS AS NEEDED
Status: DISCONTINUED | OUTPATIENT
Start: 2024-11-21 | End: 2024-11-21 | Stop reason: HOSPADM

## 2024-11-21 RX ORDER — PROTAMINE SULFATE 10 MG/ML
INJECTION, SOLUTION INTRAVENOUS AS NEEDED
Status: DISCONTINUED | OUTPATIENT
Start: 2024-11-21 | End: 2024-11-21 | Stop reason: SURG

## 2024-11-21 RX ORDER — HEPARIN SODIUM 1000 [USP'U]/ML
INJECTION, SOLUTION INTRAVENOUS; SUBCUTANEOUS AS NEEDED
Status: DISCONTINUED | OUTPATIENT
Start: 2024-11-21 | End: 2024-11-21 | Stop reason: SURG

## 2024-11-21 RX ORDER — SODIUM CHLORIDE 9 MG/ML
INJECTION, SOLUTION INTRAVENOUS CONTINUOUS PRN
Status: DISCONTINUED | OUTPATIENT
Start: 2024-11-21 | End: 2024-11-21 | Stop reason: SURG

## 2024-11-21 RX ORDER — LABETALOL HYDROCHLORIDE 5 MG/ML
5 INJECTION, SOLUTION INTRAVENOUS
Status: CANCELLED | OUTPATIENT
Start: 2024-11-21

## 2024-11-21 RX ORDER — PROPOFOL 10 MG/ML
VIAL (ML) INTRAVENOUS AS NEEDED
Status: DISCONTINUED | OUTPATIENT
Start: 2024-11-21 | End: 2024-11-21 | Stop reason: SURG

## 2024-11-21 RX ORDER — FENTANYL CITRATE 50 UG/ML
50 INJECTION, SOLUTION INTRAMUSCULAR; INTRAVENOUS
Status: CANCELLED | OUTPATIENT
Start: 2024-11-21

## 2024-11-21 RX ORDER — OXYCODONE AND ACETAMINOPHEN 10; 325 MG/1; MG/1
1 TABLET ORAL EVERY 4 HOURS PRN
Status: CANCELLED | OUTPATIENT
Start: 2024-11-21

## 2024-11-21 RX ORDER — HYDROMORPHONE HYDROCHLORIDE 1 MG/ML
0.5 INJECTION, SOLUTION INTRAMUSCULAR; INTRAVENOUS; SUBCUTANEOUS
Status: CANCELLED | OUTPATIENT
Start: 2024-11-21

## 2024-11-21 RX ORDER — ONDANSETRON 2 MG/ML
INJECTION INTRAMUSCULAR; INTRAVENOUS AS NEEDED
Status: DISCONTINUED | OUTPATIENT
Start: 2024-11-21 | End: 2024-11-21 | Stop reason: SURG

## 2024-11-21 RX ADMIN — SODIUM CHLORIDE: 9 INJECTION, SOLUTION INTRAVENOUS at 10:15

## 2024-11-21 RX ADMIN — ROCURONIUM BROMIDE 20 MG: 10 INJECTION, SOLUTION INTRAVENOUS at 11:20

## 2024-11-21 RX ADMIN — PROTAMINE SULFATE 50 MG: 10 INJECTION, SOLUTION INTRAVENOUS at 12:01

## 2024-11-21 RX ADMIN — SUGAMMADEX 200 MG: 100 INJECTION, SOLUTION INTRAVENOUS at 11:58

## 2024-11-21 RX ADMIN — ONDANSETRON 4 MG: 2 INJECTION INTRAMUSCULAR; INTRAVENOUS at 11:58

## 2024-11-21 RX ADMIN — HEPARIN SODIUM 10000 UNITS: 1000 INJECTION, SOLUTION INTRAVENOUS; SUBCUTANEOUS at 11:20

## 2024-11-21 RX ADMIN — HEPARIN SODIUM 20000 UNITS: 1000 INJECTION, SOLUTION INTRAVENOUS; SUBCUTANEOUS at 10:58

## 2024-11-21 RX ADMIN — ROCURONIUM BROMIDE 20 MG: 10 INJECTION, SOLUTION INTRAVENOUS at 10:44

## 2024-11-21 RX ADMIN — PROPOFOL 200 MG: 10 INJECTION, EMULSION INTRAVENOUS at 10:29

## 2024-11-21 RX ADMIN — ROCURONIUM BROMIDE 50 MG: 10 INJECTION, SOLUTION INTRAVENOUS at 10:29

## 2024-11-21 NOTE — H&P
"Chief Complaint  Atrial fibrillation    Subjective        History of Present Illness    EP Problems:  1.  Paroxysmal atrial fibrillation  2.  Tachy-jason syndrome  3.  IVCD     Cardiology Problems:  1.  Hypertension     Medical Problems:  1.  CKD stage IV  2.  Chronic anemia (macrocytic)    Hilario Tomas is a 54 y.o. male with past medical history as above who presents to the hospital for outpatient EP study and ablation for treatment of atrial fibrillation.  He has a history of symptomatic AF.  We discussed treatment options in the clinic including AAD use and ablation. After risk-benefit discussion, he chose to proceed with an ablation.    Since the time of the last clinic visit, denies significant change in symptoms.  Denies missing any doses of his medications.  No new ER visits or hospitalizations.      Allergies:  Macrolides and ketolides, Minoxidil, Quinolones, Erythromycin base, Keflex [cephalexin], and Penicillins      Objective   Vital Signs:  /75 (BP Location: Left arm, Patient Position: Lying)   Pulse 78   Resp 18   Ht 188 cm (74\")   Wt 89.1 kg (196 lb 6.4 oz)   SpO2 97%   BMI 25.22 kg/m²   Estimated body mass index is 25.22 kg/m² as calculated from the following:    Height as of this encounter: 188 cm (74\").    Weight as of this encounter: 89.1 kg (196 lb 6.4 oz).      Physical Exam  Vitals reviewed.   Constitutional:       Appearance: Normal appearance.   Cardiovascular:      Rate and Rhythm: Normal rate and regular rhythm.      Pulses: Normal pulses.      Heart sounds: Normal heart sounds.   Pulmonary:      Effort: Pulmonary effort is normal.      Breath sounds: Normal breath sounds.   Musculoskeletal:         General: No swelling.   Neurological:      Mental Status: He is alert and oriented to person, place, and time.   Psychiatric:         Mood and Affect: Mood normal.         Judgment: Judgment normal.            Result Review :  Labs were reviewed with relevant findings as follows: No " relevant findings               Assessment and Plan   Assessment/plan:  Hilario Tomas is a 54 y.o. male who presents to the hospital for outpatient EP study and ablation for treatment of atrial fibrillation.  There are no apparent contraindications to proceeding and he is medically appropriate for outpatient management with this procedure.      I have previously discussed and again recapitulated risks, benefits, and alternatives of an electrophysiology study and ablation for atrial fibrillation with the patient.  Alternatives discussed include continued observation and medical management.  An electrophysiology study with ablation is an inherently high risk procedure with possible complications that include but are not limited to vascular access complications, internal bleeding, tamponade requiring pericardiocentesis or cardiac surgery, stroke, MI, embolism, myocardial injury, injury to the normal conduction system requiring a pacemaker, pulmonary vein stenosis, atrio-esophageal fistula, and ultimately death.  We also discussed that given the nature of the procedure, therapeutic efficacy can be variable.  Possibilities of recurrent arrhythmias and the possible need for additional procedures and/or medical therapy was discussed. Questions asked were appropriately answered.  No guarantees were made or implied.    Despite this, they would still like to proceed.    Plan:   - Proceed with EP study and ablation            Part of this note may be an electronic transcription/translation of spoken language to printed text using the Dragon Dictation System.

## 2024-11-21 NOTE — ANESTHESIA PROCEDURE NOTES
Airway  Urgency: elective    Date/Time: 11/21/2024 10:29 AM  Airway not difficult    General Information and Staff    Patient location during procedure: OR  CRNA/CAA: Jonathan Garza CRNA    Indications and Patient Condition  Indications for airway management: airway protection    Preoxygenated: yes  MILS maintained throughout  Mask difficulty assessment: 0 - not attempted    Final Airway Details  Final airway type: endotracheal airway      Successful airway: ETT  Cuffed: yes   Successful intubation technique: direct laryngoscopy and video laryngoscopy  Blade: Lindsey  Blade size: 4  ETT size (mm): 8.0  Cormack-Lehane Classification: grade I - full view of glottis  Number of attempts at approach: 1  Assessment: lips, teeth, and gum same as pre-op and atraumatic intubation

## 2024-11-21 NOTE — DISCHARGE INSTRUCTIONS
Post-Atrial Fibrillation Ablation Instructions    ACTIVITY    Avoid driving, operating machinery, or alcohol consumption for 24 hours after receiving sedation. In addition, avoid signing legal documents or participating in legal proceedings.    You may resume normal activities after 24 hours except for the following:    Avoid heavy lifting (no more than 10 pounds) and vigorous activities for a minimum of 7 days. This includes activities such as jogging, exercise classes, sports activities, etc.    You may resume walking and other normal activities.    Avoid sitting more than 2 consecutive hours during waking hours for the first 3 days. If you are traveling, stop for brief (5 minutes) walks every two hours.    MEDICATIONS  Continue Eliquis at your usual dose this evening. Do not stop this medication without consulting with your cardiologist.      MEDICAL FOLLOW UP   EP clinic follow up with Hawa Jordan on 12/23/2024..    PLEASE NOTIFY US IF YOU DEVELOP    Fever of 101 or greater during your first few days at home    The occurrence of a new, persistent cough or unexplained shortness of breath.    A groin bruise may be expected. Initial soreness and discomfort should improve over the first few days. If you continue to have discomfort or if bruising is excessive, please call us.    Patients often experience palpitations and even atrial fibrillation during the healing period.    Please call if you have an episode of atrial fibrillation accompanied by fast heart rate greater than 120 beats per minute for extended period or Atrial Fibrillation that last longer than 1-2 days.    Mild chest soreness is common and may be treated with Tylenol, Advil, or Motrin.  This soreness typically worsens when taking deep breaths.  If you notice these symptoms, start one of these medications according to the package directions and continue for 2-3 days. If your symptoms are not relieved or become severe, please call us.      REASONS TO  GO TO THE EMERGENCY ROOM FOR EVALUATION:   Severe chest pain  Significant shortness of breath at rest  Near passing out or passing out episodes soon after your ablation  Symptoms of chest pain or shortness of breath associated with low blood pressure (reading less than 90 for the top number / systolic blood pressure)  Brisk bleeding or significant swelling from the groin where IVs were placed  Any concerns that an emergent or life threatening complication is occurring    If you have a clinical questions between the hours of 8am and 4pm, Monday - Friday please call the office and let us know your concern. Please leave a message if your call is not an emergency.    For emergencies, please go for evaluation at your nearest emergency department.    If you have a [employer] account, this an excellent way to communicate.  [employer] messages are checked Monday through Friday. Please allow 24-36 hours for your message to be answered.

## 2024-11-21 NOTE — ANESTHESIA PREPROCEDURE EVALUATION
Anesthesia Evaluation     Patient summary reviewed and Nursing notes reviewed   NPO Solid Status: > 8 hours  NPO Liquid Status: > 8 hours           Airway   Mallampati: II  No difficulty expected  Dental      Pulmonary    (+) a smoker Former, asthma,  Cardiovascular   Exercise tolerance: good (4-7 METS)    ECG reviewed  PT is on anticoagulation therapy    (+) hypertension, dysrhythmias Atrial Fib, hyperlipidemia    ROS comment: 2022 echo mild aortic regurg with ef 65%    Neuro/Psych  (+) psychiatric history  (-) seizures, TIA  GI/Hepatic/Renal/Endo    (+) renal disease- CRI    Musculoskeletal     Abdominal    Substance History      OB/GYN          Other   arthritis,                   Anesthesia Plan    ASA 3     general     intravenous induction     Anesthetic plan, risks, benefits, and alternatives have been provided, discussed and informed consent has been obtained with: patient.    CODE STATUS:

## 2024-11-21 NOTE — Clinical Note
Pt intubated per anesthesia, and under anesthesia care. Please see anesthesia record for all medications, vital signs, and hemodynamic monitoring.

## 2024-11-22 LAB
QT INTERVAL: 428 MS
QT INTERVAL: 494 MS
QTC INTERVAL: 477 MS
QTC INTERVAL: 494 MS

## 2024-11-22 NOTE — ANESTHESIA POSTPROCEDURE EVALUATION
"Patient: Hilario Tomas    Procedure Summary       Date: 11/21/24 Room / Location: [facility] CATH LAB  /  [facility] CATH INVASIVE LOCATION    Anesthesia Start: 1018 Anesthesia Stop: 1223    Procedure: Ablation atrial fibrillation - PFA Diagnosis:       Paroxysmal atrial fibrillation      (Atrial fibrillation (06635))    Providers: Angie Newton MD Provider: Jonathan Garza CRNA    Anesthesia Type: general ASA Status: 3            Anesthesia Type: general    Vitals  Vitals Value Taken Time   /82 11/21/24 1601   Temp 96.6 °F (35.9 °C) 11/21/24 1201   Pulse 64 11/21/24 1619   Resp 14 11/21/24 1600   SpO2 97 % 11/21/24 1618   Vitals shown include unfiled device data.        Post Anesthesia Care and Evaluation    Patient location during evaluation: PACU  Patient participation: complete - patient participated  Level of consciousness: awake and alert  Pain management: adequate    Airway patency: patent  Anesthetic complications: No anesthetic complications    Cardiovascular status: acceptable  Respiratory status: acceptable  Hydration status: acceptable    Comments: Blood pressure 150/82, pulse 63, resp. rate 14, height 188 cm (74\"), weight 89.1 kg (196 lb 6.4 oz), SpO2 95%.    Pt discharged from PACU based on devon score >8    "

## 2024-12-23 ENCOUNTER — OFFICE VISIT (OUTPATIENT)
Dept: CARDIOLOGY | Facility: CLINIC | Age: 54
End: 2024-12-23
Payer: COMMERCIAL

## 2024-12-23 VITALS
WEIGHT: 203 LBS | HEIGHT: 74 IN | HEART RATE: 74 BPM | OXYGEN SATURATION: 100 % | DIASTOLIC BLOOD PRESSURE: 60 MMHG | SYSTOLIC BLOOD PRESSURE: 148 MMHG | BODY MASS INDEX: 26.05 KG/M2

## 2024-12-23 DIAGNOSIS — N18.4 CKD (CHRONIC KIDNEY DISEASE) STAGE 4, GFR 15-29 ML/MIN: Chronic | ICD-10-CM

## 2024-12-23 DIAGNOSIS — I48.0 PAROXYSMAL ATRIAL FIBRILLATION: Primary | ICD-10-CM

## 2024-12-23 PROCEDURE — 99214 OFFICE O/P EST MOD 30 MIN: CPT | Performed by: PHYSICIAN ASSISTANT

## 2024-12-23 PROCEDURE — 93000 ELECTROCARDIOGRAM COMPLETE: CPT | Performed by: PHYSICIAN ASSISTANT

## 2024-12-23 NOTE — PROGRESS NOTES
"[facility] -  CLINIC FOLLOW UP     Patient Care Team:  Gilbert Dial MD as PCP - General (Family Medicine)  Amanda Edwards APRN as Nurse Practitioner (Cardiology)  Satinder Reid MD as Consulting Physician (Nephrology)  Carlito Moody MD as Consulting Physician (Gastroenterology)  Taylor Pineda APRN as Nurse Practitioner (Nephrology)  Jay Ling MD (Hematology)    Chief Complaint: follow up to ablation     Subjective   EP Problems:  1.  Paroxysmal atrial fibrillation  2.  Tachy-jason syndrome  3.  IVCD     Cardiology Problems:  1.  Hypertension  2. Chest pain      Medical Problems:  1.  CKD stage IV  2.  Chronic anemia (macrocytic)    HPI: Today I had the pleasure of seeing Hilario Tomas in the cardiology clinic for follow up. He is a 54 year old male with a  history of PAF symptomatic with fatigue. A previous Holter showed 12% afib burden with a a single 4 and half second offset pause consistent with tachy-jason syndrome as well. He opted to have PVI ablation and this was performed November 22nd without immediate complications. He had afib ablation, but no atrial flutter was noted. His HV interval was mildly elevated at 65ms.     He denies any lengthy recurrence, but has felt some palpitations. Overall, he feels better and the palpitations are much improved than prior to ablation.     Objective     Visit Vitals  /60   Pulse 74   Ht 188 cm (74.02\")   Wt 92.1 kg (203 lb)   SpO2 100%   BMI 26.05 kg/m²           Vitals reviewed.   Constitutional:       Appearance: Not in distress.   Eyes:      Extraocular Movements: Extraocular movements intact.      Conjunctiva/sclera: Conjunctivae normal.      Pupils: Pupils are equal, round, and reactive to light.   HENT:      Head: Normocephalic and atraumatic.      Nose: Nose normal.    Mouth/Throat:      Lips: Pink.      Mouth: Mucous membranes are moist.      Pharynx: Oropharynx is clear.   Neck:      " Vascular: No carotid bruit or JVD. JVD normal.   Pulmonary:      Effort: Pulmonary effort is normal.      Breath sounds: Normal breath sounds.   Chest:      Chest wall: Not tender to palpatation.   Cardiovascular:      PMI at left midclavicular line. Normal rate. Regular rhythm. Normal S1. Normal S2.       Murmurs: There is no murmur.      No gallop.  No rub.   Pulses:     Radial: 2+ bilaterally.  Edema:     Peripheral edema absent.   Musculoskeletal: Normal range of motion.      Extremities: No clubbing present.     Cervical back: Normal range of motion. Skin:     General: Skin is warm and dry.   Neurological:      General: No focal deficit present.      Mental Status: Alert and oriented to person, place, and time.   Psychiatric:         Attention and Perception: Attention normal.         Mood and Affect: Affect normal.         Speech: Speech normal.         Behavior: Behavior normal.         Cognition and Memory: Cognition normal.             The following portions of the patient's history were reviewed and updated as appropriate: allergies, current medications, past medical history, past social history, past and problem list.     Review of Systems   Constitutional: Negative.    HENT: Negative.     Eyes: Negative.    Respiratory: Negative.     Cardiovascular:  Positive for palpitations.   Gastrointestinal: Negative.    Endocrine: Negative.    Genitourinary: Negative.    Musculoskeletal: Negative.    Skin: Negative.    Allergic/Immunologic: Negative.    Neurological: Negative.    Hematological: Negative.    Psychiatric/Behavioral: Negative.              ECG 12 Lead    Date/Time: 12/23/2024 9:37 AM  Performed by: Hawa Jordan PA    Authorized by: Hawa Jordan PA  Comparison: compared with previous ECG from 11/22/2024  Rhythm: sinus rhythm  Rate: normal  BPM: 72  Conduction: non-specific intraventricular conduction delay  QRS axis: right  Comments: QRS 140ms           Medication Review: yes    Current  Outpatient Medications:     allopurinol (ZYLOPRIM) 300 MG tablet, Take 1 tablet by mouth Every Night., Disp: , Rfl:     amLODIPine (NORVASC) 10 MG tablet, Take 1 tablet by mouth Every Night., Disp: , Rfl: 0    apixaban (ELIQUIS) 5 MG tablet tablet, Take 1 tablet by mouth 2 (Two) Times a Day., Disp: 60 tablet, Rfl: 11    carBAMazepine (TEGretol) 200 MG tablet, Take 1-2 tablets by mouth 2 (Two) Times a Day. 2 tabs am, 1 tab pm, Disp: , Rfl:     clonazePAM (KlonoPIN) 1 MG tablet, Take 1 tablet by mouth 2 (Two) Times a Day., Disp: , Rfl:     cyanocobalamin (VITAMIN B-12) 500 MCG tablet, Take 1 tablet by mouth Every Night., Disp: , Rfl:     doxazosin (CARDURA) 8 MG tablet, Take 2 tablets by mouth Every Night., Disp: 60 tablet, Rfl: 11    ferrous sulfate 325 (65 FE) MG tablet, Take 1 tablet by mouth Daily With Dinner. Takes at same time as Vit C - with or just before supper, Disp: , Rfl:     furosemide (LASIX) 20 MG tablet, Take 1 tablet by mouth 2 (Two) Times a Day., Disp: , Rfl:     gemfibrozil (LOPID) 600 MG tablet, Take 1 tablet by mouth Daily With Dinner., Disp: , Rfl:     hydrALAZINE (APRESOLINE) 100 MG tablet, Take 2 tablets by mouth 2 (Two) Times a Day. (Patient taking differently: Take 2 tablets by mouth 2 (Two) Times a Day. 2 tablets BID), Disp: 360 tablet, Rfl: 3    isosorbide mononitrate (IMDUR) 120 MG 24 hr tablet, Take 1 tablet by mouth Daily., Disp: , Rfl:     lithium 300 MG tablet, Take 1 tablet by mouth Every 12 (Twelve) Hours., Disp: , Rfl:     NON FORMULARY, Generic for Procrit - gets injection from hematology for production of red blood cells (Hgb), Disp: , Rfl:     sodium bicarbonate 650 MG tablet, Take 1 tablet by mouth 2 (Two) Times a Day., Disp: , Rfl:     vitamin C (ASCORBIC ACID) 250 MG tablet, Take 2 tablets by mouth Daily With Dinner., Disp: , Rfl:     vitamin D3 125 MCG (5000 UT) capsule capsule, Take 1 capsule by mouth Daily., Disp: , Rfl:    Allergies   Allergen Reactions    Macrolides And  Ketolides Unknown - Low Severity    Minoxidil Other (See Comments)     Leg swelling, kept getting worse and worse    Quinolones Hives    Erythromycin Base Nausea And Vomiting     Severe abdominal pain that caused syncope    Keflex [Cephalexin] Rash    Penicillins Rash       I have reviewed       CBC:  Lab Results - Last 18 Months   Lab Units 11/21/24  0856   WBC 10*3/mm3 3.83   HEMOGLOBIN g/dL 9.9*   HEMATOCRIT % 31.8*   PLATELETS 10*3/mm3 159      BMP/CMP:  Lab Results - Last 18 Months   Lab Units 11/21/24  0856   SODIUM mmol/L 145   POTASSIUM mmol/L 4.1   CHLORIDE mmol/L 110*   CO2 mmol/L 21.0*   GLUCOSE mg/dL 119*   BUN mg/dL 46*   CREATININE mg/dL 3.16*   CALCIUM mg/dL 9.3         Results for orders placed in visit on 09/09/22    Adult Transthoracic Echo Complete W/ Cont if Necessary Per Protocol     Assessment:   Diagnoses and all orders for this visit:    1. Paroxysmal atrial fibrillation (Primary)  Overview:  [id number] SCORE   [id number] Score: 1 (8/20/2024  5:49 PM)    HAS-BLED SCORE   Total Risk Score (>5 Very High, 3-5 High, 2 Moderate, 1 Low): 4 (8/20/2024  5:49 PM)          Other orders  -     ECG 12 Lead    PAF: s/p ablation in November, having minimal symptoms with mild chest pain and palpitations that are resolving. He states it's nothing like what he had prior to the procedure  -Given his [id number] score is 1 and with ongoing anemia and CKD Stage IV, he does not want to continue anticoagulation indefinitely. Discussed he should continue it for 90 days post ablation and then we could consider a monitor at follow up, then discontinue if his burden of afib is minimal.   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 BP has difficult to control and medication options are limited due to his lithium, which has been one of the main effective treatments for his bipolar disorder.   -Cannot be on ACE/ARB due to previous interaction      I spent 30 minutes  caring for Hilario on this date of service. This time includes time spent by me in the following activities:preparing for the visit, reviewing tests, obtaining and/or reviewing a separately obtained history, performing a medically appropriate examination and/or evaluation , counseling and educating the patient/family/caregiver, ordering medications, tests, or procedures, referring and communicating with other health care professionals , documenting information in the medical record, and independently interpreting results and communicating that information with the patient/family/caregiver        Electronically signed by JORGE Stanford

## 2025-01-15 ENCOUNTER — TELEPHONE (OUTPATIENT)
Dept: CARDIOLOGY | Facility: CLINIC | Age: 55
End: 2025-01-15

## 2025-01-15 NOTE — TELEPHONE ENCOUNTER
Caller: Hilario Tomas    Relationship to patient: Self    Best call back number: [phone]     Chief complaint: CHEST PAIN - WENT TO ED @ [facility]    Type of visit: FOLLOW UP    Requested date: SCHEDULED APPT    If rescheduling, when is the original appointment:     Additional notes: PATIENT IS OK TO KEEP SCHEDULED - WANTED TO LET PROVIDER KNOW ABOUT RECENT TRIP TO THE ED AT [facility]. PATIENT IS FEELING BETTER TODAY. PLEASE CALL PATIENT IF SOONER APPOINTMENT IS NEEDED.   THANK YOU!

## 2025-01-16 NOTE — TELEPHONE ENCOUNTER
Called pt back. Advised records were reviewed by Amanda Edwards NP. She verbally advised me to notify pt of normal cardiac findings, bnp elevated a little, if worsening symptoms or wants to move up his appt its up to him. Patient notified. He will call if wants to move up appt. Keeping as scheduled for now. Thx

## 2025-02-18 ENCOUNTER — OFFICE VISIT (OUTPATIENT)
Dept: CARDIOLOGY | Facility: CLINIC | Age: 55
End: 2025-02-18
Payer: COMMERCIAL

## 2025-02-18 VITALS
SYSTOLIC BLOOD PRESSURE: 142 MMHG | OXYGEN SATURATION: 98 % | DIASTOLIC BLOOD PRESSURE: 86 MMHG | HEIGHT: 74 IN | WEIGHT: 205 LBS | BODY MASS INDEX: 26.31 KG/M2 | HEART RATE: 78 BPM

## 2025-02-18 DIAGNOSIS — G47.30 SLEEP APNEA, UNSPECIFIED TYPE: ICD-10-CM

## 2025-02-18 DIAGNOSIS — N18.4 ANEMIA DUE TO STAGE 4 CHRONIC KIDNEY DISEASE: ICD-10-CM

## 2025-02-18 DIAGNOSIS — I48.0 PAROXYSMAL ATRIAL FIBRILLATION: Chronic | ICD-10-CM

## 2025-02-18 DIAGNOSIS — I10 UNCONTROLLED HYPERTENSION: Chronic | ICD-10-CM

## 2025-02-18 DIAGNOSIS — I35.1 AORTIC VALVE INSUFFICIENCY, ETIOLOGY OF CARDIAC VALVE DISEASE UNSPECIFIED: Primary | Chronic | ICD-10-CM

## 2025-02-18 DIAGNOSIS — E78.00 ELEVATED CHOLESTEROL: Chronic | ICD-10-CM

## 2025-02-18 DIAGNOSIS — I47.10 PSVT (PAROXYSMAL SUPRAVENTRICULAR TACHYCARDIA): ICD-10-CM

## 2025-02-18 DIAGNOSIS — D63.1 ANEMIA DUE TO STAGE 4 CHRONIC KIDNEY DISEASE: ICD-10-CM

## 2025-02-18 DIAGNOSIS — N18.4 CKD (CHRONIC KIDNEY DISEASE) STAGE 4, GFR 15-29 ML/MIN: Chronic | ICD-10-CM

## 2025-02-18 RX ORDER — ACETAMINOPHEN 500 MG
1000 TABLET ORAL EVERY 6 HOURS PRN
COMMUNITY

## 2025-02-18 NOTE — ASSESSMENT & PLAN NOTE
Murmur worsened on exam today. Given recent symptoms, recommend rechecking echocardiogram and checking stress test. He prefers to wait until later in the year due to deductible/copay. Advised him to call if any recurrence or worsening of symptoms. Discussed possible worsening of aortic valve disease. He will call if wants to proceed with echo and where he would like to have done.

## 2025-02-18 NOTE — ASSESSMENT & PLAN NOTE
Encouraged him to only wear oxygen when sleeping and encouraged him to consider seeing sleep medicine and having a formal sleep study (at home or in lab). Encouraged gradual weight loss. Reviewed risks of untreated sleep apnea. He will call if wants to see sleep medicine.

## 2025-02-18 NOTE — ASSESSMENT & PLAN NOTE
New onset per 3/2024 extended Holter. On Eliquis since late October, 2024 (had only been on aspirin). S/p ablation 11/21/24. Heart rates remain well controlled. Call if any worsening. Defer continuing Eliquis to EP. May need to consider Watchman. Discussed need to balance stroke prevention vs risk of bleeding/worsening anemia which is multifactorial. Encouraged him to f/u with GI and to avoid constipation/straining.

## 2025-02-18 NOTE — ASSESSMENT & PLAN NOTE
Most likely secondary to worsening CKD and possible GI blood loss on Eliquis compared to full strength aspirin. Continue iron and Vit C supplementation and erythropoietin stimulator as recommended by nephrology and hematology. Encouraged him to follow up with GI - hx polypectomy 2022. Defer changing or stopping Eliquis to EP.

## 2025-02-18 NOTE — ASSESSMENT & PLAN NOTE
Better controlled per 2/2024 labs - HDL improved to 36 (not quite to goal of greater than 40) and TGs in normal range. Again encouraged healthy diet, weight loss, and routine aerobic exercise. Continue gemfibrozil. Again encouraged healthy diet and gradual increase in routine aerobic activity to help raise HDL. Requested copy of most recent labs for review - recommend checking lipid panel and CMP at least yearly.

## 2025-02-18 NOTE — ASSESSMENT & PLAN NOTE
Worsening. Nephrology following closely. Edema better controlled today. Continue taking low dose loop diuretic daily unless directed otherwise by nephrology.

## 2025-02-18 NOTE — PROGRESS NOTES
"Encounter Date:02/18/2025  Chief Complaint:   Subjective    Hilario Tomas is a 54 y.o. male who presents to [facility] [place] today for six month cardiac follow-up.      Hypertension  Associated symptoms include chest pain.   Hyperlipidemia  Associated symptoms include chest pain.   Atrial Fibrillation  Symptoms include chest pain. Past medical history includes atrial fibrillation and hyperlipidemia.   Chest Pain     His past medical history is significant for hyperlipidemia.      HPI       Hypertension     Additional comments: Has been running 130-140s/60-80s (down from 140-150s/70s).              Hyperlipidemia     Additional comments: Reportedly well controlled. Trying to eat healthy. Has difficulty getting enough exercise in the winter.             Atrial Fibrillation     Additional comments: Might be having some palpitations but only a few seconds once or twice a week. Wants to discuss Eliquis- having some \"bright red or pink bloody stools\" - usually only a little in underwear after getting constipated and straining. Will be 90 days 2/21/25. Anemia worsening - hasn't needed blood transfusion. Sees EP next week for f/u after ablation. Resting HRs mostly 60s - rarely upper 50s - doesn't change his fatigue. Fatigue has improved since ablation.             psvt     Additional comments: Rare flutters since afib ablation 11/2024 by Dr. Newton.             Follow-up     Additional comments: Six month             Chest Pain     Additional comments: Went to ER at [facility] 1-14-25. Anemic from CKD and bloody stools - seeing hematology getting EPO and oral iron. Chest pain radiated down left arm. He said he went to the ER because he knew he didn't feel right. No MI. Thinks it might have been a panic attack over work stress/not sleeping. Hasn't recurred. Not with exertion.             Cardiac Valve Problem     Additional comments: Mild AI per 9/2022. Had worsened swelling after starting minoxidil for " BP which has been stopped. Still taking furosemide daily.             Sleep Apnea     Additional comments: Possible mild per GENA on RA. Wants to know if he needs to keep wearing oxygen at night and if it would be beneficial to wear in the evenings while sitting in his recliner. Never had sleep study.          Last edited by Amanda Edwards APRN on 2/18/2025 11:38 AM.        History: Past medical, surgical, family, and social history reviewed.    Outpatient Medications Marked as Taking for the 2/18/25 encounter (Office Visit) with Amanda Edwards APRN   Medication Sig Dispense Refill    acetaminophen (TYLENOL) 500 MG tablet Take 2 tablets by mouth Every 6 (Six) Hours As Needed for Mild Pain or Headache. Taking more often lately - 3/5 days during the week      allopurinol (ZYLOPRIM) 300 MG tablet Take 1 tablet by mouth Every Night.      amLODIPine (NORVASC) 10 MG tablet Take 1 tablet by mouth Every Night.  0    apixaban (ELIQUIS) 5 MG tablet tablet Take 1 tablet by mouth 2 (Two) Times a Day. 60 tablet 11    carBAMazepine (TEGretol) 200 MG tablet Take 1-2 tablets by mouth 2 (Two) Times a Day. 2 tabs am, 1 tab pm      clonazePAM (KlonoPIN) 1 MG tablet Take 1 tablet by mouth 2 (Two) Times a Day.      cyanocobalamin (VITAMIN B-12) 500 MCG tablet Take 1 tablet by mouth Every Night.      doxazosin (CARDURA) 8 MG tablet Take 2 tablets by mouth Every Night. 60 tablet 11    ferrous sulfate 325 (65 FE) MG tablet Take 1 tablet by mouth Daily With Dinner. Takes at same time as Vit C - with or just before supper      furosemide (LASIX) 20 MG tablet Take 1 tablet by mouth Daily. In the am      gemfibrozil (LOPID) 600 MG tablet Take 1 tablet by mouth Daily With Dinner.      hydrALAZINE (APRESOLINE) 100 MG tablet Take 2 tablets by mouth 2 (Two) Times a Day. (Patient taking differently: Take 2 tablets by mouth 2 (Two) Times a Day. 2 tablets BID) 360 tablet 3    isosorbide mononitrate (IMDUR) 120 MG 24 hr tablet Take 1  "tablet by mouth Daily.      lithium 300 MG tablet Take 1 tablet by mouth Daily. Due to kidney function      NON FORMULARY Generic for Procrit - gets injection from hematology for production of red blood cells (Hgb)      sodium bicarbonate 650 MG tablet Take 1 tablet by mouth 2 (Two) Times a Day.      vitamin C (ASCORBIC ACID) 250 MG tablet Take 2 tablets by mouth Daily With Dinner.      vitamin D3 125 MCG (5000 UT) capsule capsule Take 1 capsule by mouth Daily.          Objective     Vital Signs:   /86 (BP Location: Left arm, Patient Position: Sitting, Cuff Size: Adult)   Pulse 78   Ht 188 cm (74.02\")   Wt 93 kg (205 lb)   SpO2 98%   BMI 26.31 kg/m²   Wt Readings from Last 3 Encounters:   02/18/25 93 kg (205 lb)   12/23/24 92.1 kg (203 lb)   11/21/24 89.1 kg (196 lb 6.4 oz)         Vitals reviewed.   Constitutional:       Appearance: Well-developed.   Eyes:      General: No scleral icterus.  Neck:      Vascular: No JVD.   Pulmonary:      Effort: Pulmonary effort is normal.   Cardiovascular:      Normal rate. Regular rhythm.      Murmurs: There is a grade 3/6 harsh midsystolic murmur at the URSB.      No gallop.    Pulses:     Intact distal pulses.   Edema:     Pretibial: bilateral trace pitting edema of the pretibial area.     Ankle: bilateral trace pitting edema of the ankle.  Skin:     General: Skin is warm and dry.      Comments: Hemosiderin staining distal bilateral lower extremities   Neurological:      Mental Status: Alert and oriented to person, place, and time.   Psychiatric:         Behavior: Behavior is cooperative.         Cognition and Memory: Cognition and memory normal.         Judgment: Judgment normal.         Result Review  Data Reviewed:  The following data was reviewed by: ANUSHKA Rollins on 02/18/2025  Scan on 1/16/2025 1702 by Amanda Edwards APRN: CBC/CMP/MG/BNP/TROPONINS/ [facility]/ 1-  Scan on 1/16/2025 1702 by Amanda Edwards APRN: CXR/ [facility]/ 1-   "   Scan on 1/16/2025 1701 by Amanda Edwards, APRN: EKG/ [facility]/ 1-   Lab Results - Last 18 Months   Lab Units 11/21/24  0856   BUN mg/dL 46*   CREATININE mg/dL 3.16*   SODIUM mmol/L 145   POTASSIUM mmol/L 4.1   CHLORIDE mmol/L 110*   CALCIUM mg/dL 9.3   WBC 10*3/mm3 3.83   RBC 10*6/mm3 3.27*   HEMATOCRIT % 31.8*   MCV fL 97.2*   MCH pg 30.3   INR  1.14*                  Assessment and Plan   Problem List Items Addressed This Visit          Cardiac and Vasculature    Paroxysmal atrial fibrillation (Chronic)    Overview     [id number] SCORE   [id number] Score: 1 (2/18/2025 10:33 AM)      HAS-BLED SCORE   Total Risk Score (>5 Very High, 3-5 High, 2 Moderate, 1 Low): 2 (2/18/2025 10:34 AM)               Current Assessment & Plan     New onset per 3/2024 extended Holter. On Eliquis since late October, 2024 (had only been on aspirin). S/p ablation 11/21/24. Heart rates remain well controlled. Call if any worsening. Defer continuing Eliquis to EP. May need to consider Watchman. Discussed need to balance stroke prevention vs risk of bleeding/worsening anemia which is multifactorial. Encouraged him to f/u with GI and to avoid constipation/straining.         Uncontrolled hypertension (Chronic)    Overview     Has tried clonidine patch due to dry mouth with pills. Has difficulty tolerating different medications or has drug to drug interactions with lithium. Didn't tolerate prazosin or minoxidil. Continue present therapy. Encouraged healthy diet, gradual increase in routine aerobic activity, and gradual weight loss.         Current Assessment & Plan     Remains uncontrolled today but better than previous visit and a little better per home readings. Had been much better controlled in the past. Previously discussed with nephrology - limited options given lithium and intolerances/interactions. Again encouraged healthy diet, gradual increase in activity, and weight loss. No evidence of renal artery stenosis or  pheochromocytoma per 2/2023 workup. Encouraged continued routine home BP monitoring. Again reviewed risks of uncontrolled BP.         Aortic regurgitation - Primary (Chronic)    Overview     Mild 9/2022 echo, EF 65-70%, mild LAE, normal RV size and function         Current Assessment & Plan     Murmur worsened on exam today. Given recent symptoms, recommend rechecking echocardiogram and checking stress test. He prefers to wait until later in the year due to deductible/copay. Advised him to call if any recurrence or worsening of symptoms. Discussed possible worsening of aortic valve disease. He will call if wants to proceed with echo and where he would like to have done.         Elevated cholesterol (Chronic)    Current Assessment & Plan     Better controlled per 2/2024 labs - HDL improved to 36 (not quite to goal of greater than 40) and TGs in normal range. Again encouraged healthy diet, weight loss, and routine aerobic exercise. Continue gemfibrozil. Again encouraged healthy diet and gradual increase in routine aerobic activity to help raise HDL. Requested copy of most recent labs for review - recommend checking lipid panel and CMP at least yearly.         PSVT (paroxysmal supraventricular tachycardia)    Current Assessment & Plan     Reportedly stable.            Genitourinary and Reproductive     CKD (chronic kidney disease) stage 4, GFR 15-29 ml/min (Chronic)    Current Assessment & Plan     Worsening. Nephrology following closely. Edema better controlled today. Continue taking low dose loop diuretic daily unless directed otherwise by nephrology.            Hematology and Neoplasia    Anemia    Current Assessment & Plan     Most likely secondary to worsening CKD and possible GI blood loss on Eliquis compared to full strength aspirin. Continue iron and Vit C supplementation and erythropoietin stimulator as recommended by nephrology and hematology. Encouraged him to follow up with GI - hx polypectomy 2022. Defer  changing or stopping Eliquis to EP.            Sleep    Sleep apnea    Overview     possible mild         Current Assessment & Plan     Encouraged him to only wear oxygen when sleeping and encouraged him to consider seeing sleep medicine and having a formal sleep study (at home or in lab). Encouraged gradual weight loss. Reviewed risks of untreated sleep apnea. He will call if wants to see sleep medicine.          Patient was given instructions and counseling regarding his condition or for health maintenance advice. Please see specific information pulled into the AVS if appropriate.    Follow Up :   Return in about 6 months (around 8/18/2025) for Recheck.                  Amanda Edwards, APRN, ACNP-BC, CHFN-BC

## 2025-02-18 NOTE — ASSESSMENT & PLAN NOTE
Remains uncontrolled today but better than previous visit and a little better per home readings. Had been much better controlled in the past. Previously discussed with nephrology - limited options given lithium and intolerances/interactions. Again encouraged healthy diet, gradual increase in activity, and weight loss. No evidence of renal artery stenosis or pheochromocytoma per 2/2023 workup. Encouraged continued routine home BP monitoring. Again reviewed risks of uncontrolled BP.

## 2025-02-24 ENCOUNTER — OFFICE VISIT (OUTPATIENT)
Dept: CARDIOLOGY | Facility: CLINIC | Age: 55
End: 2025-02-24
Payer: COMMERCIAL

## 2025-02-24 VITALS
WEIGHT: 206 LBS | OXYGEN SATURATION: 99 % | HEART RATE: 76 BPM | HEIGHT: 74 IN | BODY MASS INDEX: 26.44 KG/M2 | SYSTOLIC BLOOD PRESSURE: 138 MMHG | DIASTOLIC BLOOD PRESSURE: 72 MMHG

## 2025-02-24 DIAGNOSIS — N18.4 CKD (CHRONIC KIDNEY DISEASE) STAGE 4, GFR 15-29 ML/MIN: ICD-10-CM

## 2025-02-24 DIAGNOSIS — I48.0 PAROXYSMAL ATRIAL FIBRILLATION: Primary | ICD-10-CM

## 2025-02-24 DIAGNOSIS — N18.4 ANEMIA DUE TO STAGE 4 CHRONIC KIDNEY DISEASE: ICD-10-CM

## 2025-02-24 DIAGNOSIS — D63.1 ANEMIA DUE TO STAGE 4 CHRONIC KIDNEY DISEASE: ICD-10-CM

## 2025-02-24 PROCEDURE — 99214 OFFICE O/P EST MOD 30 MIN: CPT

## 2025-02-24 PROCEDURE — 93000 ELECTROCARDIOGRAM COMPLETE: CPT

## 2025-02-24 NOTE — PROGRESS NOTES
[facility] Electrophysiology   Reason For Visit:  Atrial Fibrillation     Subjective        EP Problems:  1.  Paroxysmal atrial fibrillation  -11/21/2024: Pulmonary vein isolation  2.  Tachy-jason syndrome  3.  IVCD     Cardiology Problems:  1.  Hypertension  2. Chest pain      Medical Problems:  1.  CKD stage IV  2.  Chronic anemia (macrocytic)    Hilario Tomas is a 54 y.o. male with above pertinent PMH who presents for follow-up of paroxysmal atrial fibrillation.    Since his previous appointment has been doing well.  He was in the ER and middle of January for episode of chest pain.  He was ruled out of ACS reportedly.  He believes this was due to anxiety.  He denies any significant palpitations or fluttering.  He is having some bleeding issues with his Eliquis and would like to stop it.        ROS: Pertinent findings as noted above        Pertinent past medical, surgical, family, and social history were reviewed.      Current Outpatient Medications:     acetaminophen (TYLENOL) 500 MG tablet, Take 2 tablets by mouth Every 6 (Six) Hours As Needed for Mild Pain or Headache. Taking more often lately - 3/5 days during the week, Disp: , Rfl:     allopurinol (ZYLOPRIM) 300 MG tablet, Take 1 tablet by mouth Every Night., Disp: , Rfl:     amLODIPine (NORVASC) 10 MG tablet, Take 1 tablet by mouth Every Night., Disp: , Rfl: 0    apixaban (ELIQUIS) 5 MG tablet tablet, Take 1 tablet by mouth 2 (Two) Times a Day., Disp: 60 tablet, Rfl: 11    carBAMazepine (TEGretol) 200 MG tablet, Take 1-2 tablets by mouth 2 (Two) Times a Day. 2 tabs am, 1 tab pm, Disp: , Rfl:     clonazePAM (KlonoPIN) 1 MG tablet, Take 1 tablet by mouth 2 (Two) Times a Day., Disp: , Rfl:     cyanocobalamin (VITAMIN B-12) 500 MCG tablet, Take 1 tablet by mouth Every Night., Disp: , Rfl:     doxazosin (CARDURA) 8 MG tablet, Take 2 tablets by mouth Every Night., Disp: 60 tablet, Rfl: 11    ferrous sulfate 325 (65 FE) MG tablet, Take 1 tablet by mouth Daily  "With Dinner. Takes at same time as Vit C - with or just before supper, Disp: , Rfl:     furosemide (LASIX) 20 MG tablet, Take 1 tablet by mouth Daily. In the am, Disp: , Rfl:     gemfibrozil (LOPID) 600 MG tablet, Take 1 tablet by mouth Daily With Dinner., Disp: , Rfl:     hydrALAZINE (APRESOLINE) 100 MG tablet, Take 2 tablets by mouth 2 (Two) Times a Day. (Patient taking differently: Take 2 tablets by mouth 2 (Two) Times a Day. 2 tablets BID), Disp: 360 tablet, Rfl: 3    isosorbide mononitrate (IMDUR) 120 MG 24 hr tablet, Take 1 tablet by mouth Daily., Disp: , Rfl:     lithium 300 MG tablet, Take 1 tablet by mouth Daily. Due to kidney function, Disp: , Rfl:     NON FORMULARY, Generic for Procrit - gets injection from hematology for production of red blood cells (Hgb), Disp: , Rfl:     sodium bicarbonate 650 MG tablet, Take 1 tablet by mouth 2 (Two) Times a Day., Disp: , Rfl:     vitamin C (ASCORBIC ACID) 250 MG tablet, Take 2 tablets by mouth Daily With Dinner., Disp: , Rfl:     vitamin D3 125 MCG (5000 UT) capsule capsule, Take 1 capsule by mouth Daily., Disp: , Rfl:      Objective   Vital Signs:  /72   Pulse 76   Ht 188 cm (74.02\")   Wt 93.4 kg (206 lb)   SpO2 99%   BMI 26.44 kg/m²   Estimated body mass index is 26.44 kg/m² as calculated from the following:    Height as of this encounter: 188 cm (74.02\").    Weight as of this encounter: 93.4 kg (206 lb).      Constitutional:       Appearance: Healthy appearance. Not in distress.   Pulmonary:      Effort: Pulmonary effort is normal.      Breath sounds: Normal breath sounds and air entry.   Cardiovascular:      PMI at left midclavicular line. Normal rate. Regular rhythm. Normal S1. Normal S2.       Murmurs: There is a grade 2/6 systolic murmur.      No gallop.  No click. No rub.   Pulses:     Intact distal pulses.   Edema:     Peripheral edema absent.   Neurological:      Mental Status: Alert and oriented to person, place and time.        Result Review " :  The following data was reviewed by: ANUSHKA Smith on 02/24/2025:  CMP   CMP          11/21/2024    08:56   CMP   Glucose 119    BUN 46    Creatinine 3.16    EGFR 22.2    Sodium 145    Potassium 4.1    Chloride 110    Calcium 9.3    BUN/Creatinine Ratio 14.6    Anion Gap 14.0      CBC   CBC          11/21/2024    08:56   CBC   WBC 3.83    RBC 3.27    Hemoglobin 9.9    Hematocrit 31.8    MCV 97.2    MCH 30.3    MCHC 31.1    RDW 17.2    Platelets 159      Lipid Panel        ECG 12 Lead    Date/Time: 2/24/2025 9:20 AM  Performed by: Shiraz Holt APRN    Authorized by: Shiraz Holt APRN  Comparison: compared with previous ECG from 12/23/2024  Similar to previous ECG  Comparison to previous ECG: Normal sinus rhythm; right axis deviation; intraventricular block  Rhythm: sinus rhythm  Rate: normal  Conduction: non-specific intraventricular conduction delay  QRS axis: right    Clinical impression: abnormal EKG            Assessment and Plan   Diagnoses and all orders for this visit:    1. Paroxysmal atrial fibrillation (Primary)  2. CKD (chronic kidney disease) stage 4, GFR 15-29 ml/min  3. Anemia due to stage 4 chronic kidney disease  -No known recurrence of atrial fibrillation since ablation  - Will place an 2-week heart monitor, pending results likely will stop anticoagulation given bleeding issues.  Currently [id number] score is a 1  -Continue Eliquis 5 mg twice daily at this time  - Continue follow-up with general cardiology for management of other cardiac conditions  - Follow-up in the EP clinic in 6 months or sooner if symptoms arise           I spent 2 minutes on the separately reported service of EKG interpretation. This time is not included in the time used to support the E/M service also reported today.      Follow Up   No follow-ups on file.  Patient was given instructions and counseling regarding his condition or for health maintenance advice. Please see specific information pulled into  the AVS if appropriate.       Part of this note may be an electronic transcription/translation of spoken language to printed text using the Dragon Dictation System.

## 2025-03-27 ENCOUNTER — TELEPHONE (OUTPATIENT)
Dept: CARDIOLOGY | Facility: CLINIC | Age: 55
End: 2025-03-27
Payer: COMMERCIAL

## 2025-03-31 NOTE — TELEPHONE ENCOUNTER
Angie Newton MD  Reasor, ANUSHKA Burroughs; Brittani James, NOVA Peter,    Please let him know that his Holter monitor did not show evidence of sustained arrhythmias.  He had some short episodes of SVT, but nothing actionable.    Thanks,  Angie  
LM for patient to return call.   
LM for patient to return call.   
Patient notified.     States he spoke with Shiraz about discontinuing Eliquis, and wondering if that is something he can do? Please advise   
Patient notified. Will call if he has any worsening symptoms.   
Expected Date Of Service: 03/31/2021
Billing Type: Third-Party Bill
Bill For Surgical Tray: no
Performing Laboratory: [id number]

## 2025-08-22 ENCOUNTER — OFFICE VISIT (OUTPATIENT)
Dept: CARDIOLOGY | Facility: CLINIC | Age: 55
End: 2025-08-22
Payer: COMMERCIAL

## 2025-08-22 VITALS
HEIGHT: 74 IN | OXYGEN SATURATION: 98 % | SYSTOLIC BLOOD PRESSURE: 138 MMHG | BODY MASS INDEX: 26.82 KG/M2 | DIASTOLIC BLOOD PRESSURE: 74 MMHG | WEIGHT: 209 LBS | HEART RATE: 71 BPM

## 2025-08-22 DIAGNOSIS — E78.00 ELEVATED CHOLESTEROL: Chronic | ICD-10-CM

## 2025-08-22 DIAGNOSIS — I48.0 PAROXYSMAL ATRIAL FIBRILLATION: Primary | Chronic | ICD-10-CM

## 2025-08-22 DIAGNOSIS — I47.10 PSVT (PAROXYSMAL SUPRAVENTRICULAR TACHYCARDIA): ICD-10-CM

## 2025-08-22 DIAGNOSIS — N18.4 ANEMIA DUE TO STAGE 4 CHRONIC KIDNEY DISEASE: ICD-10-CM

## 2025-08-22 DIAGNOSIS — N18.4 CKD (CHRONIC KIDNEY DISEASE) STAGE 4, GFR 15-29 ML/MIN: Chronic | ICD-10-CM

## 2025-08-22 DIAGNOSIS — D63.1 ANEMIA DUE TO STAGE 4 CHRONIC KIDNEY DISEASE: ICD-10-CM

## 2025-08-22 DIAGNOSIS — I10 UNCONTROLLED HYPERTENSION: Chronic | ICD-10-CM

## 2025-08-22 DIAGNOSIS — G47.34 NOCTURNAL HYPOXIA: ICD-10-CM

## 2025-08-22 DIAGNOSIS — G47.30 SLEEP APNEA, UNSPECIFIED TYPE: ICD-10-CM

## 2025-08-22 DIAGNOSIS — I35.1 AORTIC VALVE INSUFFICIENCY, ETIOLOGY OF CARDIAC VALVE DISEASE UNSPECIFIED: Chronic | ICD-10-CM

## 2025-08-22 RX ORDER — CALCITRIOL 0.25 UG/1
0.25 CAPSULE, LIQUID FILLED ORAL 3 TIMES WEEKLY
COMMUNITY

## 2025-08-25 ENCOUNTER — OFFICE VISIT (OUTPATIENT)
Dept: CARDIOLOGY | Facility: CLINIC | Age: 55
End: 2025-08-25
Payer: COMMERCIAL

## 2025-08-25 VITALS
WEIGHT: 205 LBS | HEART RATE: 78 BPM | SYSTOLIC BLOOD PRESSURE: 138 MMHG | BODY MASS INDEX: 26.31 KG/M2 | HEIGHT: 74 IN | OXYGEN SATURATION: 99 % | DIASTOLIC BLOOD PRESSURE: 62 MMHG

## 2025-08-25 DIAGNOSIS — I47.10 PSVT (PAROXYSMAL SUPRAVENTRICULAR TACHYCARDIA): ICD-10-CM

## 2025-08-25 DIAGNOSIS — I48.0 PAROXYSMAL ATRIAL FIBRILLATION: Primary | ICD-10-CM

## 2025-08-25 DIAGNOSIS — N18.4 CKD (CHRONIC KIDNEY DISEASE) STAGE 4, GFR 15-29 ML/MIN: ICD-10-CM

## 2025-08-25 PROCEDURE — 99214 OFFICE O/P EST MOD 30 MIN: CPT

## 2025-08-25 PROCEDURE — 93000 ELECTROCARDIOGRAM COMPLETE: CPT

## (undated) DEVICE — Device: Brand: REFERENCE PATCH CARTO 3

## (undated) DEVICE — KT NDL GUIDE STRL 18GA

## (undated) DEVICE — SI AVANTI+ 8F STD W/GW  NO OBT: Brand: AVANTI

## (undated) DEVICE — Device: Brand: PENTARAY NAV

## (undated) DEVICE — PRESSURE MONITORING SET: Brand: TRUWAVE

## (undated) DEVICE — 1 X VERSACROSS CONNECT TRANSSEPTAL DILATOR;  1 X VERSACROSS RF WIRE (INCLUDING 1 X CONNECTOR CABLE (SINGLE USE)): Brand: VERSACROSS CONNECT ACCESS SOLUTION FOR FARADRIVE

## (undated) DEVICE — CABL BIPOL W/ALLGTR CLIP/SM 12FT

## (undated) DEVICE — SOLIDIFIER LIQ LIQUILOC/PLUS W/TREAT 2000CC

## (undated) DEVICE — PULSED FIELD ABLATION CATHETER: Brand: FARAWAVE™

## (undated) DEVICE — PAD, DEFIB, ADULT, RADIOTRANS, PHYSIO: Brand: MEDLINE

## (undated) DEVICE — SYS CLS VASC/VENI VASCADE MVP 6TO12F

## (undated) DEVICE — SUREFIT, DUAL DISPERSIVE ELECTRODE, CONTACT QUALITY MONITOR: Brand: SUREFIT

## (undated) DEVICE — STPCK 3/WY HP M/RA W/OFF/HNDL 1050PSI STRL

## (undated) DEVICE — Device: Brand: WEBSTER CS

## (undated) DEVICE — SOL NS 500ML

## (undated) DEVICE — STEERABLE SHEATH CLEAR: Brand: FARADRIVE™

## (undated) DEVICE — LIMB HOLDER, WRIST/ANKLE: Brand: DEROYAL

## (undated) DEVICE — SYS COL WAST NAMIC IV SGL/LN FML/FIT W/VNT/SPK/HD 72IN

## (undated) DEVICE — PK CATH CARD 30 CA/4

## (undated) DEVICE — Device: Brand: SOUNDSTAR

## (undated) DEVICE — SLV CBL IVL 5X96IN

## (undated) DEVICE — TBG PRESS/MONITR FIX M/F LL A/ 48IN STRL

## (undated) DEVICE — SOL IRR NACL 0.9PCT BO 1000ML

## (undated) DEVICE — SI AVANTI+ 10F STD W/GW: Brand: AVANTI

## (undated) DEVICE — CATHETER CONNECTION CABLE: Brand: FARASTAR™